# Patient Record
Sex: FEMALE | Race: WHITE | NOT HISPANIC OR LATINO | Employment: FULL TIME | URBAN - METROPOLITAN AREA
[De-identification: names, ages, dates, MRNs, and addresses within clinical notes are randomized per-mention and may not be internally consistent; named-entity substitution may affect disease eponyms.]

---

## 2021-01-24 ENCOUNTER — HOSPITAL ENCOUNTER (EMERGENCY)
Facility: HOSPITAL | Age: 36
Discharge: HOME | End: 2021-01-24
Attending: EMERGENCY MEDICINE | Admitting: EMERGENCY MEDICINE

## 2021-01-24 ENCOUNTER — APPOINTMENT (OUTPATIENT)
Dept: GENERAL RADIOLOGY | Facility: HOSPITAL | Age: 36
End: 2021-01-24

## 2021-01-24 VITALS
BODY MASS INDEX: 42.95 KG/M2 | DIASTOLIC BLOOD PRESSURE: 85 MMHG | RESPIRATION RATE: 17 BRPM | HEART RATE: 85 BPM | OXYGEN SATURATION: 97 % | HEIGHT: 69 IN | WEIGHT: 290 LBS | TEMPERATURE: 98.6 F | SYSTOLIC BLOOD PRESSURE: 127 MMHG

## 2021-01-24 DIAGNOSIS — R03.0 ELEVATED BLOOD PRESSURE READING WITHOUT DIAGNOSIS OF HYPERTENSION: ICD-10-CM

## 2021-01-24 DIAGNOSIS — R00.2 PALPITATIONS: Primary | ICD-10-CM

## 2021-01-24 DIAGNOSIS — R79.89 ELEVATED TSH: ICD-10-CM

## 2021-01-24 LAB
ALBUMIN SERPL-MCNC: 4.1 G/DL (ref 3.5–5.2)
ALBUMIN/GLOB SERPL: 1.5 G/DL
ALP SERPL-CCNC: 99 U/L (ref 39–117)
ALT SERPL W P-5'-P-CCNC: 18 U/L (ref 1–33)
ANION GAP SERPL CALCULATED.3IONS-SCNC: 11 MMOL/L (ref 5–15)
AST SERPL-CCNC: 18 U/L (ref 1–32)
BASOPHILS # BLD AUTO: 0.05 10*3/MM3 (ref 0–0.2)
BASOPHILS NFR BLD AUTO: 0.6 % (ref 0–1.5)
BILIRUB SERPL-MCNC: 0.3 MG/DL (ref 0–1.2)
BILIRUB UR QL STRIP: NEGATIVE
BUN SERPL-MCNC: 13 MG/DL (ref 6–20)
BUN/CREAT SERPL: 15.1 (ref 7–25)
CALCIUM SPEC-SCNC: 9.8 MG/DL (ref 8.6–10.5)
CHLORIDE SERPL-SCNC: 102 MMOL/L (ref 98–107)
CK SERPL-CCNC: 155 U/L (ref 20–180)
CLARITY UR: CLEAR
CO2 SERPL-SCNC: 22 MMOL/L (ref 22–29)
COLOR UR: YELLOW
CREAT SERPL-MCNC: 0.86 MG/DL (ref 0.57–1)
DEPRECATED RDW RBC AUTO: 42 FL (ref 37–54)
EOSINOPHIL # BLD AUTO: 0.17 10*3/MM3 (ref 0–0.4)
EOSINOPHIL NFR BLD AUTO: 1.9 % (ref 0.3–6.2)
ERYTHROCYTE [DISTWIDTH] IN BLOOD BY AUTOMATED COUNT: 12.7 % (ref 12.3–15.4)
GFR SERPL CREATININE-BSD FRML MDRD: 76 ML/MIN/1.73
GLOBULIN UR ELPH-MCNC: 2.7 GM/DL
GLUCOSE SERPL-MCNC: 91 MG/DL (ref 65–99)
GLUCOSE UR STRIP-MCNC: NEGATIVE MG/DL
HCT VFR BLD AUTO: 39.5 % (ref 34–46.6)
HGB BLD-MCNC: 12.5 G/DL (ref 12–15.9)
HGB UR QL STRIP.AUTO: NEGATIVE
HOLD SPECIMEN: NORMAL
HOLD SPECIMEN: NORMAL
IMM GRANULOCYTES # BLD AUTO: 0.03 10*3/MM3 (ref 0–0.05)
IMM GRANULOCYTES NFR BLD AUTO: 0.3 % (ref 0–0.5)
KETONES UR QL STRIP: NEGATIVE
LEUKOCYTE ESTERASE UR QL STRIP.AUTO: NEGATIVE
LIPASE SERPL-CCNC: 31 U/L (ref 13–60)
LYMPHOCYTES # BLD AUTO: 2.63 10*3/MM3 (ref 0.7–3.1)
LYMPHOCYTES NFR BLD AUTO: 30.1 % (ref 19.6–45.3)
MAGNESIUM SERPL-MCNC: 2 MG/DL (ref 1.6–2.6)
MCH RBC QN AUTO: 28.7 PG (ref 26.6–33)
MCHC RBC AUTO-ENTMCNC: 31.6 G/DL (ref 31.5–35.7)
MCV RBC AUTO: 90.6 FL (ref 79–97)
MONOCYTES # BLD AUTO: 0.45 10*3/MM3 (ref 0.1–0.9)
MONOCYTES NFR BLD AUTO: 5.1 % (ref 5–12)
NEUTROPHILS NFR BLD AUTO: 5.41 10*3/MM3 (ref 1.7–7)
NEUTROPHILS NFR BLD AUTO: 62 % (ref 42.7–76)
NITRITE UR QL STRIP: NEGATIVE
NRBC BLD AUTO-RTO: 0 /100 WBC (ref 0–0.2)
NT-PROBNP SERPL-MCNC: 86.1 PG/ML (ref 0–450)
PH UR STRIP.AUTO: <=5 [PH] (ref 5–8)
PLATELET # BLD AUTO: 241 10*3/MM3 (ref 140–450)
PMV BLD AUTO: 9.4 FL (ref 6–12)
POTASSIUM SERPL-SCNC: 4.2 MMOL/L (ref 3.5–5.2)
PROT SERPL-MCNC: 6.8 G/DL (ref 6–8.5)
PROT UR QL STRIP: NEGATIVE
RBC # BLD AUTO: 4.36 10*6/MM3 (ref 3.77–5.28)
SODIUM SERPL-SCNC: 135 MMOL/L (ref 136–145)
SP GR UR STRIP: 1.01 (ref 1–1.03)
TROPONIN T SERPL-MCNC: <0.01 NG/ML (ref 0–0.03)
TSH SERPL DL<=0.05 MIU/L-ACNC: 4.44 UIU/ML (ref 0.27–4.2)
UROBILINOGEN UR QL STRIP: NORMAL
WBC # BLD AUTO: 8.74 10*3/MM3 (ref 3.4–10.8)
WHOLE BLOOD HOLD SPECIMEN: NORMAL
WHOLE BLOOD HOLD SPECIMEN: NORMAL

## 2021-01-24 PROCEDURE — 85025 COMPLETE CBC W/AUTO DIFF WBC: CPT | Performed by: NURSE PRACTITIONER

## 2021-01-24 PROCEDURE — 84484 ASSAY OF TROPONIN QUANT: CPT | Performed by: NURSE PRACTITIONER

## 2021-01-24 PROCEDURE — 84443 ASSAY THYROID STIM HORMONE: CPT | Performed by: NURSE PRACTITIONER

## 2021-01-24 PROCEDURE — 99283 EMERGENCY DEPT VISIT LOW MDM: CPT

## 2021-01-24 PROCEDURE — 81003 URINALYSIS AUTO W/O SCOPE: CPT | Performed by: NURSE PRACTITIONER

## 2021-01-24 PROCEDURE — 83690 ASSAY OF LIPASE: CPT | Performed by: NURSE PRACTITIONER

## 2021-01-24 PROCEDURE — 93005 ELECTROCARDIOGRAM TRACING: CPT | Performed by: EMERGENCY MEDICINE

## 2021-01-24 PROCEDURE — 80053 COMPREHEN METABOLIC PANEL: CPT | Performed by: NURSE PRACTITIONER

## 2021-01-24 PROCEDURE — 83735 ASSAY OF MAGNESIUM: CPT | Performed by: NURSE PRACTITIONER

## 2021-01-24 PROCEDURE — 83880 ASSAY OF NATRIURETIC PEPTIDE: CPT | Performed by: NURSE PRACTITIONER

## 2021-01-24 PROCEDURE — 82550 ASSAY OF CK (CPK): CPT | Performed by: NURSE PRACTITIONER

## 2021-01-24 PROCEDURE — 71045 X-RAY EXAM CHEST 1 VIEW: CPT

## 2021-01-24 PROCEDURE — 93005 ELECTROCARDIOGRAM TRACING: CPT

## 2021-01-24 RX ORDER — SODIUM CHLORIDE 0.9 % (FLUSH) 0.9 %
10 SYRINGE (ML) INJECTION AS NEEDED
Status: DISCONTINUED | OUTPATIENT
Start: 2021-01-24 | End: 2021-01-25 | Stop reason: HOSPADM

## 2021-01-24 RX ADMIN — SODIUM CHLORIDE 1000 ML: 9 INJECTION, SOLUTION INTRAVENOUS at 21:01

## 2021-01-25 NOTE — DISCHARGE INSTRUCTIONS
Home to rest.  Anticipate a call tomorrow from the outpatient cardiology heart valve clinic for further evaluation of your experience palpitations.  Follow-up with Dr. Machado to further evaluate and rule out hypothyroidism in consideration of your slightly elevated TSH tonight.  Maintain your very best hydration and nutrition.  Return to the emergency department as needed for worsening symptoms or concerns.  Thank you

## 2021-01-25 NOTE — ED PROVIDER NOTES
Subjective   Regla Holliday is a 35 year old female who presents to the ED with heart palpitations onset 2 days ago. The patient reports large spikes and frequent changes in blood pressure via a home monitor with highs of 160 mmHg systolic over 100 mmHg diastolic before returning to baseline. She also reports frequent variation in heart rate. The patient initially presented to an [facility], but she was advised to be seen in the ED. She states that she has never experienced similar symptoms in the past, and she denies history of arrhythmia, stroke, blood clots, and myocardial infarction as well as a history of cardiac catheterization. The patient has a history of panic attacks for which she takes Xanax as needed, and she reports taking Xanax once in the past 30 days. She confirms long-term use of Adderall as prescribed, denying any side effects similar to her current symptoms. The patient quit smoking in 12/2020, and she drinks a cup of coffee in the mornings She otherwise denies the use of stimulants. The patient has an intrauterine device in place, denying any chance of pregnancy. There are no other acute symptoms present at this time.      History provided by:  Patient  Palpitations  Palpitations quality:  Unable to specify  Onset quality:  Sudden  Duration:  2 days  Timing:  Sporadic  Progression:  Unchanged  Chronicity:  New  Relieved by:  Nothing  Worsened by:  Nothing  Ineffective treatments:  Deep relaxation  Risk factors: no heart disease, no hx of atrial fibrillation, no hx of DVT and no hx of PE        Review of Systems   Cardiovascular: Positive for palpitations.        Frequent variations in heart rate and blood pressure. Highest self-reported blood pressure is 160mmHg/100mmHG. The highest self-reported heart rate is 98 beats per minute.   All other systems reviewed and are negative.      History reviewed. No pertinent past medical history.    Allergies   Allergen Reactions   • Ibuprofen  Hives       History reviewed. No pertinent surgical history.    No family history on file.    Social History     Socioeconomic History   • Marital status:      Spouse name: Not on file   • Number of children: Not on file   • Years of education: Not on file   • Highest education level: Not on file         Objective   Physical Exam  Vitals signs and nursing note reviewed.   Constitutional:       General: She is not in acute distress.     Appearance: She is well-developed. She is obese.      Comments: Patient is excellent historian. No acute distress. Patient is obese.   HENT:      Head: Normocephalic and atraumatic.      Nose: Nose normal.   Eyes:      General: No scleral icterus.     Conjunctiva/sclera: Conjunctivae normal.   Neck:      Musculoskeletal: Normal range of motion and neck supple.   Cardiovascular:      Rate and Rhythm: Regular rhythm.      Heart sounds: Normal heart sounds. No murmur.      Comments: Heart sounds clear. Regular rhythm. Heart rate is 100 beats per minute. No peripheral edema appreciated bilaterally.  Pulmonary:      Effort: Pulmonary effort is normal. No respiratory distress.      Breath sounds: Normal breath sounds.      Comments: Lungs clear to oscillation bilaterally. Normal work of breathing.  Abdominal:      Palpations: Abdomen is soft.      Tenderness: There is no abdominal tenderness.      Comments: Abdomen soft and nontender.   Musculoskeletal: Normal range of motion.      Right lower leg: No edema.      Left lower leg: No edema.      Comments: No peripheral edema bilaterally.   Skin:     General: Skin is warm and dry.   Neurological:      General: No focal deficit present.      Mental Status: She is alert and oriented to person, place, and time.      Comments: No neurosensory deficits or focal weakness.   Psychiatric:         Behavior: Behavior normal.         Procedures         ED Course  ED Course as of Jan 24 2145   Sun Jan 24, 2021 2140 Patient's work-up is  reassuring. Perc criteria zero.  Wells score zero.  Her vital signs have been stable throughout her ED course.  Heart rate has remained between 60s to 80s.  She has had no hypertension or hypotension.  A sinus arrhythmia has been noted.  Patient accepts invitation to the heart and valve cardiology clinic as an outpatient.  We discussed the slight elevation in her TSH.  I have recommended her follow-up with her primary care provider to have additional thyroid studies for higher specificity to rule out hypothyroidism.    [MS]   2141 Nitrite, UA: Negative [MS]      ED Course User Index  [MS] Jason Rickettsshelly Lara, JAILENE      Recent Results (from the past 24 hour(s))   Light Blue Top    Collection Time: 01/24/21  7:24 PM   Result Value Ref Range    Extra Tube hold for add-on    Green Top (Gel)    Collection Time: 01/24/21  7:24 PM   Result Value Ref Range    Extra Tube Hold for add-ons.    Lavender Top    Collection Time: 01/24/21  7:24 PM   Result Value Ref Range    Extra Tube hold for add-on    Gold Top - SST    Collection Time: 01/24/21  7:24 PM   Result Value Ref Range    Extra Tube Hold for add-ons.    Comprehensive Metabolic Panel    Collection Time: 01/24/21  7:24 PM    Specimen: Blood   Result Value Ref Range    Glucose 91 65 - 99 mg/dL    BUN 13 6 - 20 mg/dL    Creatinine 0.86 0.57 - 1.00 mg/dL    Sodium 135 (L) 136 - 145 mmol/L    Potassium 4.2 3.5 - 5.2 mmol/L    Chloride 102 98 - 107 mmol/L    CO2 22.0 22.0 - 29.0 mmol/L    Calcium 9.8 8.6 - 10.5 mg/dL    Total Protein 6.8 6.0 - 8.5 g/dL    Albumin 4.10 3.50 - 5.20 g/dL    ALT (SGPT) 18 1 - 33 U/L    AST (SGOT) 18 1 - 32 U/L    Alkaline Phosphatase 99 39 - 117 U/L    Total Bilirubin 0.3 0.0 - 1.2 mg/dL    eGFR Non African Amer 76 >60 mL/min/1.73    Globulin 2.7 gm/dL    A/G Ratio 1.5 g/dL    BUN/Creatinine Ratio 15.1 7.0 - 25.0    Anion Gap 11.0 5.0 - 15.0 mmol/L   Lipase    Collection Time: 01/24/21  7:24 PM    Specimen: Blood   Result Value Ref Range     Lipase 31 13 - 60 U/L   BNP    Collection Time: 01/24/21  7:24 PM    Specimen: Blood   Result Value Ref Range    proBNP 86.1 0.0 - 450.0 pg/mL   Troponin    Collection Time: 01/24/21  7:24 PM    Specimen: Blood   Result Value Ref Range    Troponin T <0.010 0.000 - 0.030 ng/mL   CK    Collection Time: 01/24/21  7:24 PM    Specimen: Blood   Result Value Ref Range    Creatine Kinase 155 20 - 180 U/L   TSH    Collection Time: 01/24/21  7:24 PM    Specimen: Blood   Result Value Ref Range    TSH 4.440 (H) 0.270 - 4.200 uIU/mL   Magnesium    Collection Time: 01/24/21  7:24 PM    Specimen: Blood   Result Value Ref Range    Magnesium 2.0 1.6 - 2.6 mg/dL   CBC Auto Differential    Collection Time: 01/24/21  7:24 PM    Specimen: Blood   Result Value Ref Range    WBC 8.74 3.40 - 10.80 10*3/mm3    RBC 4.36 3.77 - 5.28 10*6/mm3    Hemoglobin 12.5 12.0 - 15.9 g/dL    Hematocrit 39.5 34.0 - 46.6 %    MCV 90.6 79.0 - 97.0 fL    MCH 28.7 26.6 - 33.0 pg    MCHC 31.6 31.5 - 35.7 g/dL    RDW 12.7 12.3 - 15.4 %    RDW-SD 42.0 37.0 - 54.0 fl    MPV 9.4 6.0 - 12.0 fL    Platelets 241 140 - 450 10*3/mm3    Neutrophil % 62.0 42.7 - 76.0 %    Lymphocyte % 30.1 19.6 - 45.3 %    Monocyte % 5.1 5.0 - 12.0 %    Eosinophil % 1.9 0.3 - 6.2 %    Basophil % 0.6 0.0 - 1.5 %    Immature Grans % 0.3 0.0 - 0.5 %    Neutrophils, Absolute 5.41 1.70 - 7.00 10*3/mm3    Lymphocytes, Absolute 2.63 0.70 - 3.10 10*3/mm3    Monocytes, Absolute 0.45 0.10 - 0.90 10*3/mm3    Eosinophils, Absolute 0.17 0.00 - 0.40 10*3/mm3    Basophils, Absolute 0.05 0.00 - 0.20 10*3/mm3    Immature Grans, Absolute 0.03 0.00 - 0.05 10*3/mm3    nRBC 0.0 0.0 - 0.2 /100 WBC   Urinalysis With Microscopic If Indicated (No Culture) - Urine, Clean Catch    Collection Time: 01/24/21  8:33 PM    Specimen: Urine, Clean Catch   Result Value Ref Range    Color, UA Yellow Yellow, Straw    Appearance, UA Clear Clear    pH, UA <=5.0 5.0 - 8.0    Specific Gravity, UA 1.010 1.001 - 1.030    Glucose,  "UA Negative Negative    Ketones, UA Negative Negative    Bilirubin, UA Negative Negative    Blood, UA Negative Negative    Protein, UA Negative Negative    Leuk Esterase, UA Negative Negative    Nitrite, UA Negative Negative    Urobilinogen, UA 0.2 E.U./dL 0.2 - 1.0 E.U./dL     Note: In addition to lab results from this visit, the labs listed above may include labs taken at another facility or during a different encounter within the last 24 hours. Please correlate lab times with ED admission and discharge times for further clarification of the services performed during this visit.    XR Chest 1 View    (Results Pending)     Vitals:    01/24/21 1903 01/24/21 2130   BP: 154/95 127/85   BP Location: Left arm    Patient Position: Sitting    Pulse: 68 71   Resp: 17    Temp: 98.6 °F (37 °C)    TempSrc: Infrared    SpO2: 99% 100%   Weight: 132 kg (290 lb)    Height: 175.3 cm (69\")      Medications   sodium chloride 0.9 % flush 10 mL (has no administration in time range)   sodium chloride 0.9 % flush 10 mL (has no administration in time range)   sodium chloride 0.9 % bolus 1,000 mL (1,000 mL Intravenous New Bag 1/24/21 2101)     ECG/EMG Results (last 24 hours)     Procedure Component Value Units Date/Time    ECG 12 Lead [[id number]] Collected: 01/24/21 1918     Updated: 01/24/21 1919        ECG 12 Lead                                                      MDM    Final diagnoses:   Palpitations   Elevated blood pressure reading without diagnosis of hypertension   Elevated TSH     DISCHARGE    Patient discharged in stable condition.    Reviewed implications of results, diagnosis, meds, responsibility to follow up, warning signs and symptoms of possible worsening, potential complications and reasons to return to ER.    Patient/Family voiced understanding of above instructions.    Discussed plan for discharge, as there is no emergent indication for admission.  Pt/family is agreeable and understands need for follow up and possible " repeat testing.  Pt/family is aware that discharge does not mean that nothing is wrong but that it indicates no emergency is currently present that requires admission and they must continue care with follow-up as given below or with a physician of their choice.     FOLLOW-UP  Gilda Machado, DO  [address]  [address]  [facility] [address]  [phone]    Schedule an appointment as soon as possible for a visit in 2 days  If symptoms worsen    [facility] - HEART & VASCULAR  [facility] [facility]  [address]  [facility] [id number] [phone]             Medication List      No changes were made to your prescriptions during this visit.         Documentation assistance provided by scribe Lauren K Collett.  Information recorded by the scribe was done at my direction and has been verified and validated by me.     Collett, Lauren K  01/24/21 4115       Lo Ricketts APRN  01/25/21 9408

## 2021-01-28 ENCOUNTER — OFFICE VISIT (OUTPATIENT)
Dept: CARDIOLOGY | Facility: HOSPITAL | Age: 36
End: 2021-01-28

## 2021-01-28 ENCOUNTER — HOSPITAL ENCOUNTER (OUTPATIENT)
Dept: CARDIOLOGY | Facility: HOSPITAL | Age: 36
Discharge: HOME | End: 2021-01-28

## 2021-01-28 VITALS
HEART RATE: 91 BPM | RESPIRATION RATE: 23 BRPM | WEIGHT: 293 LBS | HEIGHT: 69 IN | OXYGEN SATURATION: 98 % | DIASTOLIC BLOOD PRESSURE: 86 MMHG | SYSTOLIC BLOOD PRESSURE: 122 MMHG | BODY MASS INDEX: 43.4 KG/M2 | TEMPERATURE: 98.2 F

## 2021-01-28 DIAGNOSIS — R00.2 PALPITATIONS: Primary | ICD-10-CM

## 2021-01-28 LAB
QT INTERVAL: 392 MS
QTC INTERVAL: 437 MS

## 2021-01-28 PROCEDURE — 99203 OFFICE O/P NEW LOW 30 MIN: CPT | Performed by: NURSE PRACTITIONER

## 2021-01-28 PROCEDURE — 93246 EXT ECG>7D<15D RECORDING: CPT

## 2021-01-28 RX ORDER — DEXTROAMPHETAMINE SACCHARATE, AMPHETAMINE ASPARTATE, DEXTROAMPHETAMINE SULFATE AND AMPHETAMINE SULFATE 5; 5; 5; 5 MG/1; MG/1; MG/1; MG/1
20 TABLET ORAL 2 TIMES DAILY PRN
COMMUNITY
Start: 2021-01-18 | End: 2021-10-22

## 2021-01-28 RX ORDER — ALBUTEROL SULFATE 90 UG/1
AEROSOL, METERED RESPIRATORY (INHALATION)
COMMUNITY
End: 2021-10-22

## 2021-01-28 RX ORDER — ERGOCALCIFEROL 1.25 MG/1
50000 CAPSULE ORAL WEEKLY
COMMUNITY
End: 2021-10-22

## 2021-01-28 RX ORDER — COPPER 313.4 MG/1
INTRAUTERINE DEVICE INTRAUTERINE
COMMUNITY

## 2021-01-28 RX ORDER — ALPRAZOLAM 0.5 MG/1
0.5 TABLET ORAL AS NEEDED
COMMUNITY
End: 2022-12-12

## 2021-01-28 NOTE — PROGRESS NOTES
"Chief Complaint  Palpitations, Hypertension, and Establish Care    Subjective    History of Present Illness {CC  Problem List  Visit  Diagnosis   Encounters  Notes  Medications  Labs  Result Review Imaging  Media :23}     Regla Holliday presents to [facility] - HEART & VASCULAR for     Ms. Holliday follows up in the Heart and Valve Center today at the request of JAILENE Sánchez from the ER.  She presented there on 1/25/21 for symptoms of palpitations two days duration associated with elevated BP.     Patient states she has taken Adderall regularly for years and never had issues with high BP or tachycardia.  She has experienced a panic attack in the past, but her palpitation symptoms on 1/25 felt more like \"fluttering\" followed by feeling somewhat light headed and fatigued afterwards.  No excessive caffeine intake.  No alcohol intake or other stimulant use. No recurrent episodes since ER visit.       Objective     Vital Signs:   Vitals:    01/28/21 0946 01/28/21 0948 01/28/21 0949   BP: 127/80 118/77 122/86   BP Location: Right arm Left arm Left arm   Patient Position: Sitting Sitting Standing   Cuff Size: Large Adult Large Adult Large Adult   Pulse: 78 81 91   Resp:   23   Temp:   98.2 °F (36.8 °C)   TempSrc:   Temporal   SpO2: 99% 99% 98%   Weight:   135 kg (298 lb 2 oz)   Height:   175.3 cm (69\")     Body mass index is 44.03 kg/m².  Physical Exam  Vitals signs reviewed.   Constitutional:       General: She is not in acute distress.     Appearance: She is not ill-appearing.   HENT:      Head: Normocephalic and atraumatic.   Eyes:      Extraocular Movements: Extraocular movements intact.      Pupils: Pupils are equal, round, and reactive to light.   Cardiovascular:      Rate and Rhythm: Normal rate and regular rhythm.      Pulses: Normal pulses.      Heart sounds: Normal heart sounds. No murmur.   Pulmonary:      Effort: Pulmonary effort is normal.      Breath sounds: Normal breath " sounds.   Abdominal:      General: Abdomen is flat.      Palpations: Abdomen is soft.   Musculoskeletal: Normal range of motion.      Right lower leg: No edema.      Left lower leg: No edema.   Skin:     General: Skin is warm and dry.      Capillary Refill: Capillary refill takes 2 to 3 seconds.   Neurological:      General: No focal deficit present.      Mental Status: She is alert and oriented to person, place, and time.   Psychiatric:         Mood and Affect: Mood normal.         Behavior: Behavior normal.        Result Review  Data Reviewed:{ Labs  Result Review  Imaging  Med Tab  Media :23}     ER notes, labs, ekg's            Assessment and Plan {CC Problem List  Visit Diagnosis  ROS  Review (Popup)  Health Maintenance  Quality  BestPractice  Medications  SmartSets  SnapShot Encounters  Media :23}   1. Palpitations  - Holter Monitor 5-7 days to evaluate for dysrhythmia  - Adult Transthoracic Echo Complete w/ Color, Spectral and Contrast if necessary per protocol due to chronic stimulant use r/t ADHD       28 minutes    Follow Up {Instructions Charge Capture  Follow-up Communications :23}   Return in about 2 weeks (around 2/11/2021) for Video visit, Monitor/ testing follow up.    Patient was given instructions and counseling regarding her condition or for health maintenance advice. Please see specific information pulled into the AVS if appropriate.  Patient was instructed to call the Heart and Valve Center with any questions, concerns, or worsening symptoms.    *Please note that portions of this note were completed with a voice recognition program. Efforts were made to edit the dictations, but occasionally words are mistranscribed.

## 2021-01-28 NOTE — PROGRESS NOTES
[facility] Heart Monitor Documentation    Regla Holliday  1985  [id number]  01/28/21    JAILENE Cano    [] ZIO XT Patch  Model [id number] Prescribed for N/A Days    · Serial Number: (N + 9 Digits) N   · Apply-By Date on Box:   · USPS Tracking Number:   · USPS Tracking        [] [employer] MINI PLUS Mobile Cardiac Telemetry  Model BGMINIPLUS Prescribed for N/A Days    · Serial Number: (BGM + 7 Digits) BGM  · Shipped-By Date on Box:   · UPS Tracking Number: [id number]  · UPS Tracking      [] [employer] MINI Holter Monitor  Model BGMINIEL Prescribed for 7 Days    · Serial Number: (7 Digits) [id number]  · Shipped-By Date on Box: [id number]  · UPS Tracking Number: [id number]  · UPS Tracking        This monitor was applied to the patient's chest and checked for proper functioning.  Ms. Regla Holliday was instructed in the proper use of this monitor.  She was given the opportunity to ask questions and left the office with the device 's instruction manual.    Henny Rashid MA, 10:34 EST, 01/28/21                  [facility]

## 2021-02-08 ENCOUNTER — APPOINTMENT (OUTPATIENT)
Dept: CARDIOLOGY | Facility: HOSPITAL | Age: 36
End: 2021-02-08

## 2021-03-25 PROCEDURE — 93248 EXT ECG>7D<15D REV&INTERPJ: CPT | Performed by: INTERNAL MEDICINE

## 2021-04-12 ENCOUNTER — OFFICE VISIT (OUTPATIENT)
Dept: CARDIOLOGY | Facility: HOSPITAL | Age: 36
End: 2021-04-12

## 2021-04-12 NOTE — PROGRESS NOTES
Unable to reach patient by phone for visit today in order to review abnormal holter monitor study.  PVC burden is 15%.  LM to say monitor was abnormal.  Please call back to discuss details and treatment plan. Letter has also been sent out to patient on 3/30/21.    María DENT

## 2021-10-19 ENCOUNTER — HOSPITAL ENCOUNTER (OUTPATIENT)
Dept: CARDIOLOGY | Facility: HOSPITAL | Age: 36
Discharge: HOME | End: 2021-10-19
Admitting: NURSE PRACTITIONER

## 2021-10-19 DIAGNOSIS — R00.2 PALPITATIONS: ICD-10-CM

## 2021-10-19 LAB
BH CV ECHO MEAS - AO MAX PG (FULL): 5.4 MMHG
BH CV ECHO MEAS - AO MAX PG: 8.6 MMHG
BH CV ECHO MEAS - AO MEAN PG (FULL): 2.9 MMHG
BH CV ECHO MEAS - AO MEAN PG: 4.8 MMHG
BH CV ECHO MEAS - AO ROOT AREA: 8.8 CM^2
BH CV ECHO MEAS - AO ROOT DIAM: 3.4 CM
BH CV ECHO MEAS - AO V2 MAX: 146.6 CM/SEC
BH CV ECHO MEAS - AO V2 MEAN: 105.3 CM/SEC
BH CV ECHO MEAS - AO V2 VTI: 33.7 CM
BH CV ECHO MEAS - AVA(I,A): 2 CM^2
BH CV ECHO MEAS - AVA(I,D): 2 CM^2
BH CV ECHO MEAS - AVA(V,A): 1.9 CM^2
BH CV ECHO MEAS - AVA(V,D): 1.9 CM^2
BH CV ECHO MEAS - EDV(CUBED): 210.1 ML
BH CV ECHO MEAS - EDV(MOD-SP2): 193 ML
BH CV ECHO MEAS - EDV(MOD-SP4): 223 ML
BH CV ECHO MEAS - EDV(TEICH): 176.2 ML
BH CV ECHO MEAS - EF(CUBED): 60.9 %
BH CV ECHO MEAS - EF(MOD-BP): 64 %
BH CV ECHO MEAS - EF(MOD-SP2): 61.7 %
BH CV ECHO MEAS - EF(MOD-SP4): 64.6 %
BH CV ECHO MEAS - EF(TEICH): 51.6 %
BH CV ECHO MEAS - ESV(CUBED): 82.2 ML
BH CV ECHO MEAS - ESV(MOD-SP2): 74 ML
BH CV ECHO MEAS - ESV(MOD-SP4): 79 ML
BH CV ECHO MEAS - ESV(TEICH): 85.3 ML
BH CV ECHO MEAS - FS: 26.8 %
BH CV ECHO MEAS - IVS/LVPW: 0.64
BH CV ECHO MEAS - IVSD: 0.6 CM
BH CV ECHO MEAS - LA DIMENSION: 4.5 CM
BH CV ECHO MEAS - LA/AO: 1.4
BH CV ECHO MEAS - LAD MAJOR: 5.7 CM
BH CV ECHO MEAS - LAT PEAK E' VEL: 14.6 CM/SEC
BH CV ECHO MEAS - LATERAL E/E' RATIO: 7.2
BH CV ECHO MEAS - LV MASS(C)D: 185.5 GRAMS
BH CV ECHO MEAS - LV MAX PG: 3.1 MMHG
BH CV ECHO MEAS - LV MEAN PG: 2 MMHG
BH CV ECHO MEAS - LV V1 MAX: 88.7 CM/SEC
BH CV ECHO MEAS - LV V1 MEAN: 64.9 CM/SEC
BH CV ECHO MEAS - LV V1 VTI: 21.2 CM
BH CV ECHO MEAS - LVIDD: 5.9 CM
BH CV ECHO MEAS - LVIDS: 4.3 CM
BH CV ECHO MEAS - LVLD AP2: 9.1 CM
BH CV ECHO MEAS - LVLD AP4: 9.2 CM
BH CV ECHO MEAS - LVLS AP2: 8 CM
BH CV ECHO MEAS - LVLS AP4: 7.6 CM
BH CV ECHO MEAS - LVOT AREA (M): 3.1 CM^2
BH CV ECHO MEAS - LVOT AREA: 3.2 CM^2
BH CV ECHO MEAS - LVOT DIAM: 2 CM
BH CV ECHO MEAS - LVPWD: 1 CM
BH CV ECHO MEAS - MED PEAK E' VEL: 15 CM/SEC
BH CV ECHO MEAS - MEDIAL E/E' RATIO: 7
BH CV ECHO MEAS - MV A MAX VEL: 72.1 CM/SEC
BH CV ECHO MEAS - MV DEC TIME: 0.21 SEC
BH CV ECHO MEAS - MV E MAX VEL: 106 CM/SEC
BH CV ECHO MEAS - MV E/A: 1.5
BH CV ECHO MEAS - MV MAX PG: 7.4 MMHG
BH CV ECHO MEAS - MV MEAN PG: 2.4 MMHG
BH CV ECHO MEAS - MV V2 MAX: 136.3 CM/SEC
BH CV ECHO MEAS - MV V2 MEAN: 70.6 CM/SEC
BH CV ECHO MEAS - MV V2 VTI: 38.5 CM
BH CV ECHO MEAS - MVA(VTI): 1.7 CM^2
BH CV ECHO MEAS - PA ACC SLOPE: 534 CM/SEC^2
BH CV ECHO MEAS - PA ACC TIME: 0.15 SEC
BH CV ECHO MEAS - PA MAX PG: 5.2 MMHG
BH CV ECHO MEAS - PA PR(ACCEL): 12.5 MMHG
BH CV ECHO MEAS - PA V2 MAX: 114 CM/SEC
BH CV ECHO MEAS - SV(AO): 298.1 ML
BH CV ECHO MEAS - SV(CUBED): 127.8 ML
BH CV ECHO MEAS - SV(LVOT): 66.9 ML
BH CV ECHO MEAS - SV(MOD-SP2): 119 ML
BH CV ECHO MEAS - SV(MOD-SP4): 144 ML
BH CV ECHO MEAS - SV(TEICH): 90.9 ML
BH CV ECHO MEAS - TAPSE (>1.6): 2.5 CM
BH CV ECHO MEASUREMENTS AVERAGE E/E' RATIO: 7.16
BH CV XLRA - RV BASE: 3.6 CM
BH CV XLRA - RV LENGTH: 7.5 CM
BH CV XLRA - RV MID: 2.7 CM
BH CV XLRA - TDI S': 15.1 CM/SEC
LEFT ATRIUM VOLUME INDEX: 20 ML/M2
LEFT ATRIUM VOLUME: 74 ML
LV EF 2D ECHO EST: 65 %
MAXIMAL PREDICTED HEART RATE: 186 BPM
STRESS TARGET HR: 158 BPM

## 2021-10-19 PROCEDURE — 93306 TTE W/DOPPLER COMPLETE: CPT | Performed by: INTERNAL MEDICINE

## 2021-10-19 PROCEDURE — 93306 TTE W/DOPPLER COMPLETE: CPT

## 2021-10-20 ENCOUNTER — OFFICE VISIT (OUTPATIENT)
Dept: CARDIOLOGY | Facility: HOSPITAL | Age: 36
End: 2021-10-20

## 2021-10-20 VITALS
BODY MASS INDEX: 43.4 KG/M2 | WEIGHT: 293 LBS | SYSTOLIC BLOOD PRESSURE: 128 MMHG | HEART RATE: 72 BPM | OXYGEN SATURATION: 97 % | DIASTOLIC BLOOD PRESSURE: 84 MMHG | HEIGHT: 69 IN | TEMPERATURE: 97.4 F | RESPIRATION RATE: 16 BRPM

## 2021-10-20 DIAGNOSIS — I49.3 FREQUENT UNIFOCAL PVCS: ICD-10-CM

## 2021-10-20 DIAGNOSIS — R00.2 PALPITATIONS: Primary | ICD-10-CM

## 2021-10-20 PROCEDURE — 99213 OFFICE O/P EST LOW 20 MIN: CPT | Performed by: NURSE PRACTITIONER

## 2021-10-20 RX ORDER — METOPROLOL SUCCINATE 25 MG/1
25 TABLET, EXTENDED RELEASE ORAL DAILY
Qty: 30 TABLET | Refills: 11 | Status: SHIPPED | OUTPATIENT
Start: 2021-10-20 | End: 2021-10-22

## 2021-10-20 NOTE — PROGRESS NOTES
"Chief Complaint  Irregular Heart Beat and Follow-up    Subjective    History of Present Illness {CC  Problem List  Visit  Diagnosis   Encounters  Notes  Medications  Labs  Result Review Imaging  Media :23}     Regla Holliday presents to [facility] CARDIOLOGY for   Ms. Holliday follows up on palpitations.  In January, she was seen in ER for palpitations and came in to [facility] afterwards.  She completed a cardiac rhythm monitor but did not return for follow up.  PVC burden noted to be 15%.  She completed her echocardiogram yesterday.      Patient states she experiences palpitations daily, worse with when rushed or feeling anxious.  Denies dyspnea, REYNOSO, or chest pain.       Objective     Vital Signs:   Vitals:    10/20/21 0927 10/20/21 0930 10/20/21 0956   BP: 153/81 141/87 128/84   BP Location: Left arm Right arm    Patient Position: Sitting Sitting    Cuff Size: Adult Adult    Pulse: 65 72    Resp: 16     Temp: 97.4 °F (36.3 °C)     TempSrc: Temporal     SpO2: 97%     Weight: (!) 143 kg (316 lb 2 oz)     Height: 175.3 cm (69\")       Body mass index is 46.68 kg/m².  Physical Exam  Vitals reviewed.   Constitutional:       General: She is not in acute distress.     Appearance: She is obese.   HENT:      Head: Normocephalic and atraumatic.   Eyes:      Pupils: Pupils are equal, round, and reactive to light.   Neck:      Vascular: No carotid bruit.   Cardiovascular:      Rate and Rhythm: Normal rate and regular rhythm.      Pulses: Normal pulses.      Heart sounds: No murmur heard.  No friction rub. No gallop.    Pulmonary:      Effort: Pulmonary effort is normal.      Breath sounds: Normal breath sounds.   Musculoskeletal:         General: Normal range of motion.      Cervical back: Neck supple.      Right lower leg: No edema.      Left lower leg: No edema.   Lymphadenopathy:      Cervical: No cervical adenopathy.   Skin:     General: Skin is warm and dry.   Neurological:      General: No focal " deficit present.      Mental Status: She is alert and oriented to person, place, and time.   Psychiatric:         Mood and Affect: Mood normal.         Behavior: Behavior normal.          Result Review  Data Reviewed:{ Labs  Result Review  Imaging  Med Tab  Media :23}     Cardiology studies :    Holter Monitor - 72 Hour Up To 15 Days (01/28/2021 16:26)  Adult Transthoracic Echo Complete w/ Color, Spectral and Contrast if necessary per protocol (10/19/2021 17:04)           Assessment and Plan {CC Problem List  Visit Diagnosis  ROS  Review (Popup)  Health Maintenance  Quality  BestPractice  Medications  SmartSets  SnapShot Encounters  Media :23}   1. Palpitations  - PVC burden 15%  - echo WNL.  LVEF 65%  - trial metoprolol xl 25 mg QPM  - she plans to discuss w/ Behavioral Health regarding potential DC stimulant Adderall  - Ambulator referral to Sleep Medicine (snoring and poorly refreshing sleep)  - work toward healthier BMI- daily walking 20-30 minutes  - alcohol in moderation/ avoid caffeine      32 minutes    Follow Up {Instructions Charge Capture  Follow-up Communications :23}   Return in about 1 month (around 11/20/2021).    Patient was given instructions and counseling regarding her condition or for health maintenance advice. Please see specific information pulled into the AVS if appropriate.  Patient was instructed to call the Heart and Valve Center with any questions, concerns, or worsening symptoms.

## 2021-10-22 ENCOUNTER — OFFICE VISIT (OUTPATIENT)
Dept: FAMILY MEDICINE CLINIC | Facility: CLINIC | Age: 36
End: 2021-10-22

## 2021-10-22 DIAGNOSIS — Z12.4 ENCOUNTER FOR PAPANICOLAOU SMEAR FOR CERVICAL CANCER SCREENING: ICD-10-CM

## 2021-10-22 DIAGNOSIS — Z00.00 ENCOUNTER FOR MEDICAL EXAMINATION TO ESTABLISH CARE: Primary | ICD-10-CM

## 2021-10-22 DIAGNOSIS — N64.52 BREAST DISCHARGE: ICD-10-CM

## 2021-10-22 DIAGNOSIS — Z11.59 ENCOUNTER FOR HEPATITIS C SCREENING TEST FOR LOW RISK PATIENT: ICD-10-CM

## 2021-10-22 DIAGNOSIS — Z13.220 SCREENING, LIPID: ICD-10-CM

## 2021-10-22 DIAGNOSIS — N63.21 BREAST LUMP ON LEFT SIDE AT 2 O'CLOCK POSITION: ICD-10-CM

## 2021-10-22 DIAGNOSIS — Z13.1 SCREENING FOR DIABETES MELLITUS (DM): ICD-10-CM

## 2021-10-22 DIAGNOSIS — Z23 IMMUNIZATION DUE: ICD-10-CM

## 2021-10-22 DIAGNOSIS — Z80.3 FAMILY HX-BREAST MALIGNANCY: ICD-10-CM

## 2021-10-22 DIAGNOSIS — Z11.4 SCREENING FOR HIV (HUMAN IMMUNODEFICIENCY VIRUS): ICD-10-CM

## 2021-10-22 DIAGNOSIS — Z13.29 SCREENING FOR THYROID DISORDER: ICD-10-CM

## 2021-10-22 DIAGNOSIS — J45.909 ASTHMA DUE TO ENVIRONMENTAL ALLERGIES: ICD-10-CM

## 2021-10-22 PROCEDURE — 90686 IIV4 VACC NO PRSV 0.5 ML IM: CPT | Performed by: NURSE PRACTITIONER

## 2021-10-22 PROCEDURE — 99395 PREV VISIT EST AGE 18-39: CPT | Performed by: NURSE PRACTITIONER

## 2021-10-22 PROCEDURE — 90471 IMMUNIZATION ADMIN: CPT | Performed by: NURSE PRACTITIONER

## 2021-10-22 RX ORDER — ALBUTEROL SULFATE 90 UG/1
2 AEROSOL, METERED RESPIRATORY (INHALATION) EVERY 4 HOURS PRN
Qty: 18 G | Refills: 11 | Status: SHIPPED | OUTPATIENT
Start: 2021-10-22 | End: 2022-11-11

## 2021-10-25 DIAGNOSIS — J45.909 ASTHMA DUE TO ENVIRONMENTAL ALLERGIES: Primary | ICD-10-CM

## 2021-11-01 ENCOUNTER — OFFICE VISIT (OUTPATIENT)
Dept: OBSTETRICS AND GYNECOLOGY | Facility: CLINIC | Age: 36
End: 2021-11-01

## 2021-11-01 VITALS
BODY MASS INDEX: 43.4 KG/M2 | WEIGHT: 293 LBS | SYSTOLIC BLOOD PRESSURE: 130 MMHG | HEIGHT: 69 IN | DIASTOLIC BLOOD PRESSURE: 70 MMHG

## 2021-11-01 DIAGNOSIS — N63.25 BREAST LUMP ON LEFT SIDE AT 3 O'CLOCK POSITION: ICD-10-CM

## 2021-11-01 DIAGNOSIS — N64.52 NIPPLE DISCHARGE: ICD-10-CM

## 2021-11-01 DIAGNOSIS — N63.21 BREAST LUMP ON LEFT SIDE AT 2 O'CLOCK POSITION: ICD-10-CM

## 2021-11-01 DIAGNOSIS — N64.52 BREAST DISCHARGE: Primary | ICD-10-CM

## 2021-11-01 DIAGNOSIS — N96 HABITUAL ABORTER NOT CURRENTLY PREGNANT: ICD-10-CM

## 2021-11-01 DIAGNOSIS — Z01.419 ROUTINE GYNECOLOGICAL EXAMINATION: Primary | ICD-10-CM

## 2021-11-01 PROCEDURE — 99385 PREV VISIT NEW AGE 18-39: CPT | Performed by: OBSTETRICS & GYNECOLOGY

## 2021-11-01 NOTE — PROGRESS NOTES
GYN Annual Exam     CC - Here for annual exam. Patient is a new patient    Subjective   HPI  Regla Holliday is a 36 y.o. female, , who presents for annual well woman exam.   Patient's last menstrual period was 10/18/2021 (exact date)..  Periods are regular and last 7 days..    Dysmenorrhea:severe, occurring premenstrually and first 1-2 days of flow.  Patient reports problems with: none.      Partner Status: Marital Status: .  New Partners since last visit: no.  Desires STD Screening: no.  HPV vaccinated? : no    H/o miscarriage x 3.  Her doctor at the time said it was b/c she is obese.  She has not had a workup for it.  Recently diagnosed with celiac disease and she wonders if this is why.    Additional OB/GYN History   Current contraception: Paragard IUD inserted   Desires to: continue contraception    Last Pap : several years ago  Last Completed Pap Smear          PAP SMEAR (Every 3 Years) Next due on 2021  SCANNED - PAP SMEAR              History of abnormal Pap smear: no  Family history of uterine, colon, breast, or ovarian cancer: yes - Pat aunts x3 breast ca  Performs monthly Self-Breast Exam: yes  Exercises Regularly:no  Feelings of Anxiety or Depression: yes - treated by PCP  Tobacco Usage?: No   OB History        3    Para        Term                AB   3    Living           SAB   3    IAB        Ectopic        Molar        Multiple        Live Births                    Health Maintenance   Topic Date Due   • ANNUAL PHYSICAL  Never done   • Pneumococcal Vaccine 0-64 (1 of 2 - PPSV23) Never done   • TDAP/TD VACCINES (1 - Tdap) Never done   • HEPATITIS C SCREENING  Never done   • Annual Gynecologic Pelvic and Breast Exam  2022   • PAP SMEAR  2024   • COVID-19 Vaccine  Completed   • INFLUENZA VACCINE  Completed       The additional following portions of the patient's history were reviewed and updated as appropriate: problem list.    Review of  "Systems   All other systems reviewed and are negative.      I have reviewed and agree with the HPI, ROS, and historical information as entered above. Blanca Archibald MD    Objective   /70   Ht 175.3 cm (69\")   Wt (!) 144 kg (317 lb)   LMP 10/18/2021 (Exact Date)   Breastfeeding No Comment: Paragard  BMI 46.81 kg/m²     Physical Exam    General:  well developed; obese, no acute distress  Skin:  No suspicious lesions seen  Thyroid: normal to inspection and palpation  Breasts:  Examined in supine position  Symmetric without skin dimpling, Mass on left ~ 3oclock, ~2-3cm, ill defined  Nipples normal without inversion, lesions, left nipple discharge, clear/milky  There are no palpable axillary nodes  CVS: RRR, no M/R/G, distal pulses wnl  Resp: CTAB, No W/R/R  Abdomen: soft, non-tender; no masses  no umbilical or inguinal hernias are present  no hepato-splenomegaly  Pelvis: Clinical staff was present for exam  External genitalia:  normal appearance of the external genitalia including Bartholin's and [place]'s glands.  Urethra: no masses or tenderness  Urethral meatus: normal size;  No lesions or signs of prolapse  Bladder: non tender to palpation, no masses, no prolapse  Vagina:  normal pink mucosa without prolapse or lesions.  Cervix:  normal appearance.  Uterus:  normal size, shape and consistency.  Adnexa:  normal bimanual exam of the adnexa.  Perineum/Anus: normal appearance, no external hemorrhoids  Ext: 2+ pulses, no edema    Assessment/Plan     Assessment     Problem List Items Addressed This Visit     Nipple discharge    Relevant Orders    Non-gynecologic Cytology    Habitual aborter not currently pregnant    Relevant Orders    Prolactin    Anticardiolipin Antibody, IgG / M, Qn    Lupus Anticoagulant Panel    Von Willebrand Screen    Antithrombin III    Protein C & S, Functional    Routine gynecological examination - Primary    Relevant Orders    Liquid-based Pap Smear, Screening    Breast lump on left " side at 3 o'clock position          1. GYN annual well woman exam.     Plan     1. Reviewed Pap screening guidelines  2. Pap with HPV done/declines STD screening  3. Reviewed monthly self breast exams.  Instructed to call with lumps, pain, or breast discharge.    4. Reviewed BMI and weight loss as preventative health measures.   5. Reviewed exercise and healthy diet as a preventative health measures.   6. RTC in 1 year or PRN with problems  7. Other: Nipple smear   8. Patient also has never had labs for multiple miscarriages.  We discussed preconception planning as she may want to try when Paragard due to be removed.      Blanca Archibald MD  11/01/2021

## 2021-11-02 ENCOUNTER — TELEPHONE (OUTPATIENT)
Dept: FAMILY MEDICINE CLINIC | Facility: CLINIC | Age: 36
End: 2021-11-02

## 2021-11-02 NOTE — PROGRESS NOTES
Diagnostic center is requesting diagnostic mammogram order.     Ordered.   Joelle Mcpherson, APRN

## 2021-11-02 NOTE — TELEPHONE ENCOUNTER
----- Message from JAILENE Bruce sent at 11/2/2021  1:22 PM EDT -----  Regarding: aida Chambers, can you check on this?   Shital Lai   ----- Message -----  From: Vel Leahy MA  Sent: 11/1/2021   6:30 PM EDT  To: JAILENE Bruce  Subject: FW: Breo                                           ----- Message -----  From: Regla Holliday  Sent: 11/1/2021   5:52 PM EDT  [facility]  Subject: Breo                                             Thank you. I also have not been contacted to schedule the Ultrasound yet.

## 2021-11-02 NOTE — TELEPHONE ENCOUNTER
LVM INFORMING PT THAT PER BREAST CENTER'S PROTOCOL, THEY REQUIRE A DX BILATERAL MAMMO BEFORE THEY WILL DO ULTRASOUND DUE TO PT AGE. THIS HAS BEEN ORDERED AND SENT TO THE SCHEDULERS AT THE BREAST CENTER. INFORMED HER THAT SHE SHOULD RECIEVE A CALL IN THE NEXT WEEK TO SCHEDULE.

## 2021-11-03 ENCOUNTER — LAB (OUTPATIENT)
Dept: LAB | Facility: HOSPITAL | Age: 36
End: 2021-11-03

## 2021-11-03 ENCOUNTER — LAB (OUTPATIENT)
Dept: FAMILY MEDICINE CLINIC | Facility: CLINIC | Age: 36
End: 2021-11-03

## 2021-11-03 DIAGNOSIS — N64.52 NIPPLE DISCHARGE: Primary | ICD-10-CM

## 2021-11-03 DIAGNOSIS — Z11.59 ENCOUNTER FOR HEPATITIS C SCREENING TEST FOR LOW RISK PATIENT: ICD-10-CM

## 2021-11-03 DIAGNOSIS — Z13.1 SCREENING FOR DIABETES MELLITUS (DM): ICD-10-CM

## 2021-11-03 DIAGNOSIS — Z00.00 ENCOUNTER FOR MEDICAL EXAMINATION TO ESTABLISH CARE: ICD-10-CM

## 2021-11-03 DIAGNOSIS — N96 HABITUAL ABORTER NOT CURRENTLY PREGNANT: ICD-10-CM

## 2021-11-03 LAB
25(OH)D3 SERPL-MCNC: 33.3 NG/ML (ref 30–100)
ALBUMIN SERPL-MCNC: 4.5 G/DL (ref 3.5–5.2)
ALBUMIN/GLOB SERPL: 1.8 G/DL
ALP SERPL-CCNC: 112 U/L (ref 39–117)
ALT SERPL W P-5'-P-CCNC: 30 U/L (ref 1–33)
ANION GAP SERPL CALCULATED.3IONS-SCNC: 9.7 MMOL/L (ref 5–15)
AST SERPL-CCNC: 24 U/L (ref 1–32)
BASOPHILS # BLD AUTO: 0.04 10*3/MM3 (ref 0–0.2)
BASOPHILS NFR BLD AUTO: 0.5 % (ref 0–1.5)
BILIRUB SERPL-MCNC: 0.4 MG/DL (ref 0–1.2)
BUN SERPL-MCNC: 10 MG/DL (ref 6–20)
BUN/CREAT SERPL: 11.1 (ref 7–25)
CALCIUM SPEC-SCNC: 9.6 MG/DL (ref 8.6–10.5)
CHLORIDE SERPL-SCNC: 104 MMOL/L (ref 98–107)
CHOLEST SERPL-MCNC: 210 MG/DL (ref 0–200)
CO2 SERPL-SCNC: 24.3 MMOL/L (ref 22–29)
CREAT SERPL-MCNC: 0.9 MG/DL (ref 0.57–1)
DEPRECATED RDW RBC AUTO: 40.3 FL (ref 37–54)
EOSINOPHIL # BLD AUTO: 0.15 10*3/MM3 (ref 0–0.4)
EOSINOPHIL NFR BLD AUTO: 2.1 % (ref 0.3–6.2)
ERYTHROCYTE [DISTWIDTH] IN BLOOD BY AUTOMATED COUNT: 13.2 % (ref 12.3–15.4)
GFR SERPL CREATININE-BSD FRML MDRD: 72 ML/MIN/1.73
GLOBULIN UR ELPH-MCNC: 2.5 GM/DL
GLUCOSE SERPL-MCNC: 91 MG/DL (ref 65–99)
HAV IGM SERPL QL IA: NORMAL
HBA1C MFR BLD: 5 % (ref 4.8–5.6)
HBV CORE IGM SERPL QL IA: NORMAL
HBV SURFACE AG SERPL QL IA: NORMAL
HCT VFR BLD AUTO: 39.5 % (ref 34–46.6)
HCV AB SER DONR QL: NORMAL
HDLC SERPL-MCNC: 73 MG/DL (ref 40–60)
HGB BLD-MCNC: 13.6 G/DL (ref 12–15.9)
HIV1+2 AB SER QL: NORMAL
IMM GRANULOCYTES # BLD AUTO: 0.02 10*3/MM3 (ref 0–0.05)
IMM GRANULOCYTES NFR BLD AUTO: 0.3 % (ref 0–0.5)
LDLC SERPL CALC-MCNC: 112 MG/DL (ref 0–100)
LDLC/HDLC SERPL: 1.47 {RATIO}
LYMPHOCYTES # BLD AUTO: 2.4 10*3/MM3 (ref 0.7–3.1)
LYMPHOCYTES NFR BLD AUTO: 32.9 % (ref 19.6–45.3)
MCH RBC QN AUTO: 29.6 PG (ref 26.6–33)
MCHC RBC AUTO-ENTMCNC: 34.4 G/DL (ref 31.5–35.7)
MCV RBC AUTO: 85.9 FL (ref 79–97)
MONOCYTES # BLD AUTO: 0.39 10*3/MM3 (ref 0.1–0.9)
MONOCYTES NFR BLD AUTO: 5.3 % (ref 5–12)
NEUTROPHILS NFR BLD AUTO: 4.3 10*3/MM3 (ref 1.7–7)
NEUTROPHILS NFR BLD AUTO: 58.9 % (ref 42.7–76)
NRBC BLD AUTO-RTO: 0 /100 WBC (ref 0–0.2)
PLATELET # BLD AUTO: 283 10*3/MM3 (ref 140–450)
PMV BLD AUTO: 9.7 FL (ref 6–12)
POTASSIUM SERPL-SCNC: 4.6 MMOL/L (ref 3.5–5.2)
PROLACTIN SERPL-MCNC: 23.5 NG/ML (ref 4.79–23.3)
PROT SERPL-MCNC: 7 G/DL (ref 6–8.5)
RBC # BLD AUTO: 4.6 10*6/MM3 (ref 3.77–5.28)
SODIUM SERPL-SCNC: 138 MMOL/L (ref 136–145)
T4 SERPL-MCNC: 8.72 MCG/DL (ref 4.5–11.7)
TRIGL SERPL-MCNC: 147 MG/DL (ref 0–150)
TSH SERPL DL<=0.05 MIU/L-ACNC: 3.51 UIU/ML (ref 0.27–4.2)
VLDLC SERPL-MCNC: 25 MG/DL (ref 5–40)
WBC # BLD AUTO: 7.3 10*3/MM3 (ref 3.4–10.8)

## 2021-11-03 PROCEDURE — 85598 HEXAGNAL PHOSPH PLTLT NEUTRL: CPT

## 2021-11-03 PROCEDURE — 85246 CLOT FACTOR VIII VW ANTIGEN: CPT

## 2021-11-03 PROCEDURE — 85670 THROMBIN TIME PLASMA: CPT

## 2021-11-03 PROCEDURE — 80074 ACUTE HEPATITIS PANEL: CPT | Performed by: NURSE PRACTITIONER

## 2021-11-03 PROCEDURE — 86146 BETA-2 GLYCOPROTEIN ANTIBODY: CPT

## 2021-11-03 PROCEDURE — 80053 COMPREHEN METABOLIC PANEL: CPT | Performed by: NURSE PRACTITIONER

## 2021-11-03 PROCEDURE — 84436 ASSAY OF TOTAL THYROXINE: CPT | Performed by: NURSE PRACTITIONER

## 2021-11-03 PROCEDURE — 84146 ASSAY OF PROLACTIN: CPT

## 2021-11-03 PROCEDURE — 85300 ANTITHROMBIN III ACTIVITY: CPT

## 2021-11-03 PROCEDURE — 85732 THROMBOPLASTIN TIME PARTIAL: CPT

## 2021-11-03 PROCEDURE — 83036 HEMOGLOBIN GLYCOSYLATED A1C: CPT | Performed by: NURSE PRACTITIONER

## 2021-11-03 PROCEDURE — 85597 PHOSPHOLIPID PLTLT NEUTRALIZ: CPT

## 2021-11-03 PROCEDURE — 85306 CLOT INHIBIT PROT S FREE: CPT

## 2021-11-03 PROCEDURE — 85730 THROMBOPLASTIN TIME PARTIAL: CPT

## 2021-11-03 PROCEDURE — 85240 CLOT FACTOR VIII AHG 1 STAGE: CPT

## 2021-11-03 PROCEDURE — 85303 CLOT INHIBIT PROT C ACTIVITY: CPT

## 2021-11-03 PROCEDURE — 81001 URINALYSIS AUTO W/SCOPE: CPT | Performed by: NURSE PRACTITIONER

## 2021-11-03 PROCEDURE — 85613 RUSSELL VIPER VENOM DILUTED: CPT

## 2021-11-03 PROCEDURE — 86147 CARDIOLIPIN ANTIBODY EA IG: CPT

## 2021-11-03 PROCEDURE — 82306 VITAMIN D 25 HYDROXY: CPT | Performed by: NURSE PRACTITIONER

## 2021-11-03 PROCEDURE — 85025 COMPLETE CBC W/AUTO DIFF WBC: CPT | Performed by: NURSE PRACTITIONER

## 2021-11-03 PROCEDURE — 85610 PROTHROMBIN TIME: CPT

## 2021-11-03 PROCEDURE — G0432 EIA HIV-1/HIV-2 SCREEN: HCPCS | Performed by: NURSE PRACTITIONER

## 2021-11-03 PROCEDURE — 85245 CLOT FACTOR VIII VW RISTOCTN: CPT

## 2021-11-03 PROCEDURE — 84443 ASSAY THYROID STIM HORMONE: CPT | Performed by: NURSE PRACTITIONER

## 2021-11-03 PROCEDURE — 36415 COLL VENOUS BLD VENIPUNCTURE: CPT

## 2021-11-03 PROCEDURE — 80061 LIPID PANEL: CPT | Performed by: NURSE PRACTITIONER

## 2021-11-03 NOTE — PROGRESS NOTES
May be a papilloma.  F/u with a breast surgeon for their opinion after imiaging already ordered.  I'll put in referral

## 2021-11-04 VITALS
TEMPERATURE: 97 F | OXYGEN SATURATION: 97 % | BODY MASS INDEX: 43.4 KG/M2 | WEIGHT: 293 LBS | HEART RATE: 78 BPM | RESPIRATION RATE: 18 BRPM | HEIGHT: 69 IN | SYSTOLIC BLOOD PRESSURE: 106 MMHG | DIASTOLIC BLOOD PRESSURE: 58 MMHG

## 2021-11-04 DIAGNOSIS — N63.0 BREAST MASS IN FEMALE: Primary | ICD-10-CM

## 2021-11-04 LAB
BACTERIA UR QL AUTO: NORMAL /HPF
BILIRUB UR QL STRIP: NEGATIVE
CARDIOLIPIN IGG SER IA-ACNC: <9 GPL U/ML (ref 0–14)
CARDIOLIPIN IGM SER IA-ACNC: <9 MPL U/ML (ref 0–12)
CLARITY UR: CLEAR
COLOR UR: YELLOW
GLUCOSE UR STRIP-MCNC: NEGATIVE MG/DL
HGB UR QL STRIP.AUTO: NEGATIVE
HYALINE CASTS UR QL AUTO: NORMAL /LPF
KETONES UR QL STRIP: NEGATIVE
LEUKOCYTE ESTERASE UR QL STRIP.AUTO: ABNORMAL
NITRITE UR QL STRIP: NEGATIVE
PH UR STRIP.AUTO: 5.5 [PH] (ref 5–8)
PROT UR QL STRIP: NEGATIVE
RBC # UR: NORMAL /HPF
REF LAB TEST METHOD: NORMAL
SP GR UR STRIP: 1.01 (ref 1–1.03)
SQUAMOUS #/AREA URNS HPF: NORMAL /HPF
UROBILINOGEN UR QL STRIP: ABNORMAL
WBC UR QL AUTO: NORMAL /HPF

## 2021-11-05 LAB
PROT C ACT/NOR PPP: 152 % (ref 73–180)
PROT S ACT/NOR PPP: 79 % (ref 63–140)

## 2021-11-06 LAB — AT III ACT/NOR PPP CHRO: 120 % (ref 75–135)

## 2021-11-22 LAB
APTT HEX PL PPP: 0 SEC
APTT IMM NP PPP: NORMAL S
APTT PPP 1:1 SALINE: NORMAL S
APTT PPP: 24.2 SEC
B2 GLYCOPROT1 IGA SER-ACNC: <10 SAU
B2 GLYCOPROT1 IGG SER-ACNC: <10 SGU
B2 GLYCOPROT1 IGM SER-ACNC: <10 SMU
CARDIOLIPIN IGG SER IA-ACNC: <10 GPL
CARDIOLIPIN IGM SER IA-ACNC: 11 MPL
CONFIRM APTT: 0 SEC
CONFIRM DRVVT: NORMAL S
DRVVT SCREEN TO CONFIRM RATIO: NORMAL {RATIO}
INR PPP: 0.9 RATIO
LABORATORY COMMENT REPORT: NORMAL
PROTHROMBIN TIME: 9.7 SEC
SCREEN DRVVT: 37.2 SEC
THROMBIN TIME: 16.8 SEC

## 2021-11-24 LAB
APTT PPP: 25.1 SEC
FACT VIII ACT/NOR PPP: 148 %
VWF AG ACT/NOR PPP IA: 157 %
VWF:RCO ACT/NOR PPP PL AGG: 143 %

## 2021-11-30 ENCOUNTER — CLINICAL SUPPORT (OUTPATIENT)
Dept: GENETICS | Facility: HOSPITAL | Age: 36
End: 2021-11-30

## 2021-11-30 ENCOUNTER — LAB (OUTPATIENT)
Dept: LAB | Facility: HOSPITAL | Age: 36
End: 2021-11-30

## 2021-11-30 DIAGNOSIS — C50.912 MALIGNANT NEOPLASM OF LEFT BREAST IN FEMALE, ESTROGEN RECEPTOR POSITIVE, UNSPECIFIED SITE OF BREAST (HCC): ICD-10-CM

## 2021-11-30 DIAGNOSIS — Z80.41 FAMILY HISTORY OF MALIGNANT NEOPLASM OF OVARY: ICD-10-CM

## 2021-11-30 DIAGNOSIS — Z13.79 GENETIC TESTING: Primary | ICD-10-CM

## 2021-11-30 DIAGNOSIS — C50.012 MALIGNANT NEOPLASM OF NIPPLE OF LEFT BREAST IN FEMALE, ESTROGEN RECEPTOR POSITIVE (HCC): Primary | ICD-10-CM

## 2021-11-30 DIAGNOSIS — Z80.3 FAMILY HISTORY OF MALIGNANT NEOPLASM OF BREAST: ICD-10-CM

## 2021-11-30 DIAGNOSIS — Z17.0 MALIGNANT NEOPLASM OF NIPPLE OF LEFT BREAST IN FEMALE, ESTROGEN RECEPTOR POSITIVE (HCC): Primary | ICD-10-CM

## 2021-11-30 DIAGNOSIS — Z17.0 MALIGNANT NEOPLASM OF LEFT BREAST IN FEMALE, ESTROGEN RECEPTOR POSITIVE, UNSPECIFIED SITE OF BREAST (HCC): ICD-10-CM

## 2021-11-30 PROCEDURE — 96040: CPT | Performed by: GENETIC COUNSELOR, MS

## 2021-12-01 ENCOUNTER — APPOINTMENT (OUTPATIENT)
Dept: GENERAL RADIOLOGY | Facility: HOSPITAL | Age: 36
End: 2021-12-01

## 2021-12-01 ENCOUNTER — HOSPITAL ENCOUNTER (EMERGENCY)
Facility: HOSPITAL | Age: 36
Discharge: HOME | End: 2021-12-01
Attending: EMERGENCY MEDICINE | Admitting: EMERGENCY MEDICINE

## 2021-12-01 VITALS
BODY MASS INDEX: 43.4 KG/M2 | SYSTOLIC BLOOD PRESSURE: 126 MMHG | OXYGEN SATURATION: 98 % | WEIGHT: 293 LBS | RESPIRATION RATE: 16 BRPM | TEMPERATURE: 97.7 F | DIASTOLIC BLOOD PRESSURE: 96 MMHG | HEIGHT: 69 IN | HEART RATE: 92 BPM

## 2021-12-01 DIAGNOSIS — C50.919 MALIGNANT NEOPLASM OF FEMALE BREAST, UNSPECIFIED ESTROGEN RECEPTOR STATUS, UNSPECIFIED LATERALITY, UNSPECIFIED SITE OF BREAST (HCC): ICD-10-CM

## 2021-12-01 DIAGNOSIS — F43.0 PANIC STATE AS ACUTE REACTION TO STRESS: Primary | ICD-10-CM

## 2021-12-01 LAB
ALBUMIN SERPL-MCNC: 4.6 G/DL (ref 3.5–5.2)
ALBUMIN/GLOB SERPL: 1.7 G/DL
ALP SERPL-CCNC: 104 U/L (ref 39–117)
ALT SERPL W P-5'-P-CCNC: 41 U/L (ref 1–33)
ANION GAP SERPL CALCULATED.3IONS-SCNC: 11 MMOL/L (ref 5–15)
AST SERPL-CCNC: 29 U/L (ref 1–32)
BASOPHILS # BLD AUTO: 0.06 10*3/MM3 (ref 0–0.2)
BASOPHILS NFR BLD AUTO: 0.6 % (ref 0–1.5)
BILIRUB SERPL-MCNC: 0.4 MG/DL (ref 0–1.2)
BUN SERPL-MCNC: 16 MG/DL (ref 6–20)
BUN/CREAT SERPL: 14.8 (ref 7–25)
CALCIUM SPEC-SCNC: 9.6 MG/DL (ref 8.6–10.5)
CHLORIDE SERPL-SCNC: 102 MMOL/L (ref 98–107)
CO2 SERPL-SCNC: 24 MMOL/L (ref 22–29)
CREAT SERPL-MCNC: 1.08 MG/DL (ref 0.57–1)
D DIMER PPP FEU-MCNC: 0.36 MCGFEU/ML (ref 0–0.56)
DEPRECATED RDW RBC AUTO: 42.5 FL (ref 37–54)
EOSINOPHIL # BLD AUTO: 0.19 10*3/MM3 (ref 0–0.4)
EOSINOPHIL NFR BLD AUTO: 2 % (ref 0.3–6.2)
ERYTHROCYTE [DISTWIDTH] IN BLOOD BY AUTOMATED COUNT: 13 % (ref 12.3–15.4)
GFR SERPL CREATININE-BSD FRML MDRD: 58 ML/MIN/1.73
GLOBULIN UR ELPH-MCNC: 2.7 GM/DL
GLUCOSE SERPL-MCNC: 102 MG/DL (ref 65–99)
HCT VFR BLD AUTO: 40.6 % (ref 34–46.6)
HGB BLD-MCNC: 13.2 G/DL (ref 12–15.9)
HOLD SPECIMEN: NORMAL
IMM GRANULOCYTES # BLD AUTO: 0.03 10*3/MM3 (ref 0–0.05)
IMM GRANULOCYTES NFR BLD AUTO: 0.3 % (ref 0–0.5)
LYMPHOCYTES # BLD AUTO: 3.32 10*3/MM3 (ref 0.7–3.1)
LYMPHOCYTES NFR BLD AUTO: 35.4 % (ref 19.6–45.3)
MCH RBC QN AUTO: 28.9 PG (ref 26.6–33)
MCHC RBC AUTO-ENTMCNC: 32.5 G/DL (ref 31.5–35.7)
MCV RBC AUTO: 88.8 FL (ref 79–97)
MONOCYTES # BLD AUTO: 0.66 10*3/MM3 (ref 0.1–0.9)
MONOCYTES NFR BLD AUTO: 7 % (ref 5–12)
NEUTROPHILS NFR BLD AUTO: 5.13 10*3/MM3 (ref 1.7–7)
NEUTROPHILS NFR BLD AUTO: 54.7 % (ref 42.7–76)
NRBC BLD AUTO-RTO: 0 /100 WBC (ref 0–0.2)
NT-PROBNP SERPL-MCNC: 110.8 PG/ML (ref 0–450)
PLATELET # BLD AUTO: 256 10*3/MM3 (ref 140–450)
PMV BLD AUTO: 9.2 FL (ref 6–12)
POTASSIUM SERPL-SCNC: 4.6 MMOL/L (ref 3.5–5.2)
PROT SERPL-MCNC: 7.3 G/DL (ref 6–8.5)
RBC # BLD AUTO: 4.57 10*6/MM3 (ref 3.77–5.28)
SODIUM SERPL-SCNC: 137 MMOL/L (ref 136–145)
T4 FREE SERPL-MCNC: 1.27 NG/DL (ref 0.93–1.7)
TROPONIN T SERPL-MCNC: <0.01 NG/ML (ref 0–0.03)
TSH SERPL DL<=0.05 MIU/L-ACNC: 3.19 UIU/ML (ref 0.27–4.2)
WBC NRBC COR # BLD: 9.39 10*3/MM3 (ref 3.4–10.8)
WHOLE BLOOD HOLD SPECIMEN: NORMAL
WHOLE BLOOD HOLD SPECIMEN: NORMAL

## 2021-12-01 PROCEDURE — 71045 X-RAY EXAM CHEST 1 VIEW: CPT

## 2021-12-01 PROCEDURE — 36415 COLL VENOUS BLD VENIPUNCTURE: CPT

## 2021-12-01 PROCEDURE — 83880 ASSAY OF NATRIURETIC PEPTIDE: CPT | Performed by: EMERGENCY MEDICINE

## 2021-12-01 PROCEDURE — 99283 EMERGENCY DEPT VISIT LOW MDM: CPT

## 2021-12-01 PROCEDURE — 84439 ASSAY OF FREE THYROXINE: CPT | Performed by: PHYSICIAN ASSISTANT

## 2021-12-01 PROCEDURE — 93005 ELECTROCARDIOGRAM TRACING: CPT

## 2021-12-01 PROCEDURE — 85025 COMPLETE CBC W/AUTO DIFF WBC: CPT

## 2021-12-01 PROCEDURE — 85379 FIBRIN DEGRADATION QUANT: CPT | Performed by: PHYSICIAN ASSISTANT

## 2021-12-01 PROCEDURE — 93005 ELECTROCARDIOGRAM TRACING: CPT | Performed by: EMERGENCY MEDICINE

## 2021-12-01 PROCEDURE — 84484 ASSAY OF TROPONIN QUANT: CPT | Performed by: EMERGENCY MEDICINE

## 2021-12-01 PROCEDURE — 84443 ASSAY THYROID STIM HORMONE: CPT | Performed by: PHYSICIAN ASSISTANT

## 2021-12-01 PROCEDURE — 80053 COMPREHEN METABOLIC PANEL: CPT | Performed by: EMERGENCY MEDICINE

## 2021-12-01 RX ORDER — SODIUM CHLORIDE 0.9 % (FLUSH) 0.9 %
10 SYRINGE (ML) INJECTION AS NEEDED
Status: DISCONTINUED | OUTPATIENT
Start: 2021-12-01 | End: 2021-12-02 | Stop reason: HOSPADM

## 2021-12-01 RX ORDER — DIAZEPAM 5 MG/1
10 TABLET ORAL ONCE
Status: COMPLETED | OUTPATIENT
Start: 2021-12-01 | End: 2021-12-01

## 2021-12-01 RX ADMIN — DIAZEPAM 10 MG: 5 TABLET ORAL at 20:58

## 2021-12-02 NOTE — ED PROVIDER NOTES
EMERGENCY DEPARTMENT ENCOUNTER    Pt Name: Regla Holliday  MRN: [id number]  Pt :   1985  Room Number:  TRI1/T1  Date of encounter:  2021  PCP: Joelle Mcpherson APRN  ED Provider: Linus Echeverria PA-C    Historian: Patient      HPI:  Chief Complaint: Anxiety, panic attack, shortness of breath, palpitations        Context: Regla Holliday is a 36 y.o. female who presents to the ED c/o panic attack tonight, with associated PVCs and shortness of breath.  Patient states she recently found out that she has breast cancer.  She is still in the process of mapping out her treatment plan.  She is quite anxious, has a history of panic attacks, states tonight she had a panic attack which is kept escalating and was associated with palpitations.  She has a history of PVCs for which she is followed by JAILENE Cano.  No chest pain arm neck jaw shoulder pain.  No syncope, but she did have an episode tonight during her panic attack in which she thought she would pass out, blood pressure taken at that time was 88 systolic.      PAST MEDICAL HISTORY  Past Medical History:   Diagnosis Date   • ADHD    • Celiac disease    • Panic attacks          PAST SURGICAL HISTORY  Past Surgical History:   Procedure Laterality Date   • MOUTH SURGERY     • NASAL SEPTUM SURGERY     • TONSILLECTOMY AND ADENOIDECTOMY           FAMILY HISTORY  Family History   Problem Relation Age of Onset   • Kidney disease Mother    • Hypertension Mother    • Hypothyroidism Mother    • Arrhythmia Father    • Heart failure Father    • Hypertension Father    • Hyperlipidemia Father    • Bone's esophagus Father    • Diabetes Father    • Anxiety disorder Sister    • COPD Maternal Grandmother    • Heart failure Maternal Grandmother    • Osteoporosis Maternal Grandmother    • Heart attack Maternal Grandfather    • Leukemia Paternal Grandmother    • Diabetes Paternal Grandfather    • Other Paternal Grandfather         heart problems   • Breast  cancer Paternal Aunt          SOCIAL HISTORY  Social History     Socioeconomic History   • Marital status:    • Number of children: 0   Tobacco Use   • Smoking status: Former Smoker     Quit date: 2020     Years since quittin.2   • Smokeless tobacco: Never Used   Substance and Sexual Activity   • Alcohol use: Yes   • Drug use: Defer   • Sexual activity: Yes     Partners: Male     Birth control/protection: I.U.D.     Comment: Paragard IUD since          ALLERGIES  Ibuprofen and Naproxen        REVIEW OF SYSTEMS  Review of Systems   Constitutional: Positive for activity change. Negative for appetite change, chills, fatigue and fever.   HENT: Negative for congestion, rhinorrhea and sore throat.    Respiratory: Positive for shortness of breath. Negative for cough, chest tightness and wheezing.    Cardiovascular: Positive for palpitations. Negative for chest pain.   Gastrointestinal: Negative for abdominal pain, nausea and vomiting.   Genitourinary: Negative for dysuria, flank pain and hematuria.   Musculoskeletal: Negative for back pain, myalgias and neck pain.   Neurological: Positive for dizziness. Negative for seizures, syncope and headaches.              PHYSICAL EXAM    I have reviewed the triage vital signs and nursing notes.    ED Triage Vitals [21]   Temp Heart Rate Resp BP SpO2   97.7 °F (36.5 °C) 99 20 132/74 98 %      Temp src Heart Rate Source Patient Position BP Location FiO2 (%)   Oral Monitor Sitting Right arm --       Physical Exam  GENERAL:   Tearful, anxious, awake alert oriented nontoxic-appearing and not in acute distress.  HENT: Nares patent.  EYES: No scleral icterus.  CV: Regular rhythm, regular rate.  Occasional irregular beat noted.  No murmurs rubs or gallops.  RESPIRATORY: Normal effort.  No audible wheezes, rales or rhonchi.  ABDOMEN: Soft, nontender  MUSCULOSKELETAL: No deformities.   NEURO: Alert, moves all extremities, follows commands.  SKIN: Warm, dry, no  rash visualized.        LAB RESULTS  Recent Results (from the past 24 hour(s))   Comprehensive Metabolic Panel    Collection Time: 12/01/21  7:38 PM    Specimen: Blood   Result Value Ref Range    Glucose 102 (H) 65 - 99 mg/dL    BUN 16 6 - 20 mg/dL    Creatinine 1.08 (H) 0.57 - 1.00 mg/dL    Sodium 137 136 - 145 mmol/L    Potassium 4.6 3.5 - 5.2 mmol/L    Chloride 102 98 - 107 mmol/L    CO2 24.0 22.0 - 29.0 mmol/L    Calcium 9.6 8.6 - 10.5 mg/dL    Total Protein 7.3 6.0 - 8.5 g/dL    Albumin 4.60 3.50 - 5.20 g/dL    ALT (SGPT) 41 (H) 1 - 33 U/L    AST (SGOT) 29 1 - 32 U/L    Alkaline Phosphatase 104 39 - 117 U/L    Total Bilirubin 0.4 0.0 - 1.2 mg/dL    eGFR Non African Amer 58 (L) >60 mL/min/1.73    Globulin 2.7 gm/dL    A/G Ratio 1.7 g/dL    BUN/Creatinine Ratio 14.8 7.0 - 25.0    Anion Gap 11.0 5.0 - 15.0 mmol/L   BNP    Collection Time: 12/01/21  7:38 PM    Specimen: Blood   Result Value Ref Range    proBNP 110.8 0.0 - 450.0 pg/mL   Troponin    Collection Time: 12/01/21  7:38 PM    Specimen: Blood   Result Value Ref Range    Troponin T <0.010 0.000 - 0.030 ng/mL   Green Top (Gel)    Collection Time: 12/01/21  7:38 PM   Result Value Ref Range    Extra Tube Hold for add-ons.    Lavender Top    Collection Time: 12/01/21  7:38 PM   Result Value Ref Range    Extra Tube hold for add-on    Gold Top - SST    Collection Time: 12/01/21  7:38 PM   Result Value Ref Range    Extra Tube Hold for add-ons.    Light Blue Top    Collection Time: 12/01/21  7:38 PM   Result Value Ref Range    Extra Tube hold for add-on    CBC Auto Differential    Collection Time: 12/01/21  7:38 PM    Specimen: Blood   Result Value Ref Range    WBC 9.39 3.40 - 10.80 10*3/mm3    RBC 4.57 3.77 - 5.28 10*6/mm3    Hemoglobin 13.2 12.0 - 15.9 g/dL    Hematocrit 40.6 34.0 - 46.6 %    MCV 88.8 79.0 - 97.0 fL    MCH 28.9 26.6 - 33.0 pg    MCHC 32.5 31.5 - 35.7 g/dL    RDW 13.0 12.3 - 15.4 %    RDW-SD 42.5 37.0 - 54.0 fl    MPV 9.2 6.0 - 12.0 fL    Platelets  256 140 - 450 10*3/mm3    Neutrophil % 54.7 42.7 - 76.0 %    Lymphocyte % 35.4 19.6 - 45.3 %    Monocyte % 7.0 5.0 - 12.0 %    Eosinophil % 2.0 0.3 - 6.2 %    Basophil % 0.6 0.0 - 1.5 %    Immature Grans % 0.3 0.0 - 0.5 %    Neutrophils, Absolute 5.13 1.70 - 7.00 10*3/mm3    Lymphocytes, Absolute 3.32 (H) 0.70 - 3.10 10*3/mm3    Monocytes, Absolute 0.66 0.10 - 0.90 10*3/mm3    Eosinophils, Absolute 0.19 0.00 - 0.40 10*3/mm3    Basophils, Absolute 0.06 0.00 - 0.20 10*3/mm3    Immature Grans, Absolute 0.03 0.00 - 0.05 10*3/mm3    nRBC 0.0 0.0 - 0.2 /100 WBC   TSH    Collection Time: 12/01/21  7:38 PM    Specimen: Blood   Result Value Ref Range    TSH 3.190 0.270 - 4.200 uIU/mL   T4, Free    Collection Time: 12/01/21  7:38 PM    Specimen: Blood   Result Value Ref Range    Free T4 1.27 0.93 - 1.70 ng/dL   D-dimer, Quantitative    Collection Time: 12/01/21  7:38 PM    Specimen: Blood   Result Value Ref Range    D-Dimer, Quantitative 0.36 0.00 - 0.56 MCGFEU/mL       If labs were ordered, I independently reviewed the results.        RADIOLOGY  XR Chest 1 View    Result Date: 12/1/2021  CR Chest 1 Vw INDICATION: Short of air COMPARISON:  Chest x-ray 1/24/2021 FINDINGS: Portable AP view(s) of the chest.  Study is limited by the patient's obesity. Lung volumes are low and there is likely mild cardiac silhouette enlargement. There is at least some crowding of the parenchymal markings with likely mild bibasilar atelectasis. No acute congestive heart failure or pneumothorax.     1. Low lung volumes with likely mild bibasilar atelectasis. If symptoms persist, recommend correlation with a follow-up PA lateral chest x-ray to exclude an infiltrate. 2. Mild cardiac silhouette enlargement but no acute congestive heart failure suspected Signer Name: Palmira Chapman MD  Signed: 12/1/2021 8:58 PM  Workstation Name: [facility]  [facility]    MAMMO Outside Films    Result Date: 12/1/2021  This procedure was  auto-finalized with no dictation required.    MAMMO Outside Films    Result Date: 12/1/2021  This procedure was auto-finalized with no dictation required.          PROCEDURES    Procedures    ECG 12 Lead             MEDICATIONS GIVEN IN ER    Medications   sodium chloride 0.9 % flush 10 mL (has no administration in time range)   diazePAM (VALIUM) tablet 10 mg (10 mg Oral Given 12/1/21 2058)         PROGRESS, DATA ANALYSIS, CONSULTS, AND MEDICAL DECISION MAKING    All labs have been independently reviewed by me.  All radiology studies have been reviewed by me and the radiologist dictating the report.   EKG's have been independently viewed and interpreted by me.                 Work-up here is encouraging, she does have a few PVCs on her EKG.  D-dimer, troponin, BNP, TSH, and T4 are all normal.  She does have a slight bump of her creatinine and GFR is 58.  Looking back over the notes it appears as though her cardiology NP recommended a trial of metoprolol 25 mg at bedtime.  Patient states she did not want to try that at the time because she thought quitting her Adderall would resolve the symptoms, which has helped.  However I do think it is appropriate to trial metoprolol 25 mg at bedtime.  She has a prescription of Xanax 0.5 mg at home.  We discussed how to appropriately take this to help reduce her anxiety.  She is to follow-up with her PCP tomorrow to discuss ongoing anxiolysis.  Signs and symptoms of worsening were reviewed and she was encouraged to return to the ER if she has any change or worsening of symptoms.  She verbalizes understanding and is agreeable to plan.      AS OF 21:51 EST VITALS:    BP - 132/74  HR - 99  TEMP - 97.7 °F (36.5 °C) (Oral)  O2 SATS - 98%                  DIAGNOSIS  Final diagnoses:   Panic state as acute reaction to stress         DISPOSITION  DISCHARGE    Patient discharged in stable condition.    Reviewed implications of results, diagnosis, meds, responsibility to follow up, warning  signs and symptoms of possible worsening, potential complications and reasons to return to ER.    Patient/Family voiced understanding of above instructions.    Discussed plan for discharge, as there is no emergent indication for admission.  Pt/family is agreeable and understands need for follow up and possible repeat testing.  Pt/family is aware that discharge does not mean that nothing is wrong but that it indicates no emergency is currently present that requires admission and they must continue care with follow-up as given below or with a physician of their choice.     FOLLOW-UP  María Pinedo, APRN  [address]  [address]  [place] [id number] [phone]    Call   To advise of your visit and treatment plan here in the emergency department.    Joelle Mcpherson, APRN  [address]  [address]  [place] [id number] [phone]    Schedule an appointment as soon as possible for a visit   As needed         Medication List      New Prescriptions    metoprolol tartrate 25 MG tablet  Commonly known as: LOPRESSOR  Take 1 tablet by mouth every night at bedtime for 15 days.           Where to Get Your Medications      These medications were sent to [facility] [address] [facility] [address] [facility] - [phone]  - [phone] [facility] [address]    Phone: [phone]   · metoprolol tartrate 25 MG tablet                  Linus Echeverria PA-C  12/01/21 [id number]

## 2021-12-02 NOTE — DISCHARGE INSTRUCTIONS
Recheck with your primary care provider this week, and follow-up with your cardiology NP to discuss ongoing metoprolol prescription.  Return to the emergency department immediately if any change or worsening of symptoms.

## 2021-12-03 ENCOUNTER — OFFICE VISIT (OUTPATIENT)
Dept: FAMILY MEDICINE CLINIC | Facility: CLINIC | Age: 36
End: 2021-12-03

## 2021-12-03 ENCOUNTER — TRANSCRIBE ORDERS (OUTPATIENT)
Dept: OCCUPATIONAL THERAPY | Facility: HOSPITAL | Age: 36
End: 2021-12-03

## 2021-12-03 VITALS
HEART RATE: 80 BPM | DIASTOLIC BLOOD PRESSURE: 90 MMHG | RESPIRATION RATE: 18 BRPM | BODY MASS INDEX: 43.4 KG/M2 | OXYGEN SATURATION: 99 % | WEIGHT: 293 LBS | HEIGHT: 69 IN | TEMPERATURE: 97 F | SYSTOLIC BLOOD PRESSURE: 122 MMHG

## 2021-12-03 DIAGNOSIS — D05.12 INTRADUCTAL CARCINOMA IN SITU OF LEFT BREAST: ICD-10-CM

## 2021-12-03 DIAGNOSIS — R00.2 HEART PALPITATIONS: ICD-10-CM

## 2021-12-03 DIAGNOSIS — F41.9 ANXIETY: Primary | ICD-10-CM

## 2021-12-03 DIAGNOSIS — D05.12 DUCTAL CARCINOMA IN SITU OF LEFT BREAST: Primary | ICD-10-CM

## 2021-12-03 LAB
QT INTERVAL: 382 MS
QTC INTERVAL: 474 MS

## 2021-12-03 PROCEDURE — 99213 OFFICE O/P EST LOW 20 MIN: CPT | Performed by: NURSE PRACTITIONER

## 2021-12-03 NOTE — PROGRESS NOTES
"Chief Complaint  Breast Discharge (Follow up Breast Cancer) and Anxiety    Subjective          Regla Holliday presents to [facility]  Patient is a 35 yo female. She is here for for follow up from a regular appointment.   Since her last appointment she had her mammogram and biopsy. She has been diagnosed with \"high grade ductal carcinoma in situ.\" She is having a follow up with [facility] for follow up for Breast cancer. She is currently planning to have surgery for bilateral mastectomy. Patient has appointment at the end of the week to discuss her treatment plan and options. She is seeing 12/09/21 Enzo Arenas and second opinion Dr. Brody Barraza .   She is tearful. She was seen Dec. 1, 2021 in the ER for anxiety, panic attack, heart palpitations. She was given diazepam. She was discharged. Diagnosis was   Panic state as acute reaction to stress.     She is currently seeing María DENT for cardiology. Patient stopped her adderall due to possibility of the heart palpitations. She was started on metoprolol 25 mg qhs. She is hesitant to take the medication. She is upset while here in the office crying. Asked about her support system and her spouse is outside in the car waiting. She became upset talking about her diagnosis and we discussed her medications. She states she has  xanax at home and is to take it as needed.  We discussed her following back with her therapist. She states she stopped seeing her since stopping the Adderall. \"it costs me a lot of money that my insurance doesn't cover to see them\".  She is thinking she may need to go back on her Adderall., Patient upset that this APRN's license is limited by the [employer] in the state [facility] and cannot write for her Adderall. She will need to see a Psychiatric APRN or psychiatrist. She is currently declining referral to  Behavioral health. She states \" I just want to go on home.\" \"you are nice and everything but " "I feel like you are trying to diminish this\". \" I just want to go on home.\"  This provider offered to call and have her spouse come into the office and she declined. Again she stood up saying\" I just want to go on home today\".             The following portions of the patient's history were reviewed and updated as appropriate: allergies, current medications, past family history, past medical history, past social history, past surgical history and problem list.    Review of Systems   Constitutional: Positive for activity change.   HENT: Negative.    Respiratory: Negative.    Cardiovascular: Positive for palpitations.   Gastrointestinal: Negative.    Skin: Negative.    Psychiatric/Behavioral: The patient is nervous/anxious.        Objective   Vital Signs:   /90   Pulse 80   Temp 97 °F (36.1 °C) (Infrared)   Resp 18   Ht 175.3 cm (69\")   Wt (!) 148 kg (326 lb 3.2 oz)   SpO2 99%   BMI 48.17 kg/m²     Physical Exam  Vitals reviewed.   HENT:      Nose: Nose normal.      Mouth/Throat:      Mouth: Mucous membranes are moist.   Eyes:      Pupils: Pupils are equal, round, and reactive to light.   Cardiovascular:      Rate and Rhythm: Normal rate.   Pulmonary:      Effort: Pulmonary effort is normal.   Skin:     General: Skin is warm and dry.      Capillary Refill: Capillary refill takes less than 2 seconds.   Neurological:      Mental Status: She is alert and oriented to person, place, and time.   Psychiatric:         Mood and Affect: Mood is anxious. Affect is tearful.         Cognition and Memory: Cognition normal.         Judgment: Judgment is not impulsive.        Result Review :                 Assessment and Plan    There are no diagnoses linked to this encounter.       Patient declined referral to  Behavioral Health.   She states she has enough xanax and will try to get back in with her therapist.   She planning to keep her follow up with oncology.   She has already seen the Genetic counselor. Waiting on " results.          Follow Up   Return in about 1 week (around 12/10/2021).  Patient was given instructions and counseling regarding her condition or for health maintenance advice. Please see specific information pulled into the AVS if appropriate.

## 2021-12-03 NOTE — PROGRESS NOTES
Regla Holliday, a 36-year-old female, was seen for genetic counseling due to a personal history of breast cancer.  Ms. 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 She is in the process of making a surgical decision. Ms. Holliday retains her uterus and ovaries. She was interested in discussing her risk for a hereditary cancer syndrome.  Ms. Holliday was interested in pursuing a multi-gene panel to evaluate her risk of cancer, therefore the CancerNext panel was ordered through [employer] which analyzes BRCA1/2 and 34 additional genes associated with an increased cancer risk. Results from the high/moderate risk breast cancer genes are expected in 10 days, and results from the remainder of the panel are expected in 2-3 weeks.    PERTINENT FAMILY HISTORY: (See attached pedigree)   Pat. Aunt:  Breast cancer, 50s  Pat. Aunt:  Breast cancer, 40s  Pat. Grandmother: Acute leukemia, 60s     Colon cancer, 70s  Pat. Great Aunt: Breast cancer, 60s     Ovarian cancer, 60s    We do not have medical records regarding any of these diagnoses.      RISK ASSESSMENT:  Ms. Holliday’s personal and family history of cancer led to concern for a hereditary cancer syndrome. We discussed BRCA1/2 testing as well as the option of pursuing a panel that would test for other genes known to impact cancer risk in addition to BRCA1/2.   Ms. Holliday clearly meets NCCN guidelines criteria for BRCA1/2 testing based on her personal history of breast cancer diagnosed before age 45, and family history of breast cancer. These risk assessments are based on the family history information provided at the time of the appointment, and could change in the future should new information be obtained.    GENETIC COUNSELING: (30 minutes) We reviewed the family history information in detail. Cases of cancer follow three general patterns: sporadic, familial, and hereditary.  While most cancer is sporadic, some cases appear to occur in family  clusters.  These cases are said to be familial and account for 10-20% of cancer cases.  Familial cases may be due to a combination of shared genes and environmental factors among family members.  In even fewer families (5-10%), the cancer is said to be inherited, and the genes responsible for the cancer are known.      Family histories typical of hereditary cancer syndromes usually include multiple first- and second-degree relatives diagnosed with cancer types that define a syndrome.  These cases tend to be diagnosed at younger-than-expected ages and can be bilateral or multifocal.  The cancer in these families follows an autosomal dominant inheritance pattern, which indicates the likely presence of a mutation in a cancer susceptibility gene.  Children and siblings of an individual believed to carry this mutation have a 50% chance of inheriting that mutation, thereby inheriting the increased risk to develop cancer.  These mutations can be passed down from the maternal or the paternal lineage.    Hereditary breast cancer accounts for 5-10% of all cases of breast cancer.  A significant proportion of hereditary breast and ovarian cancer can be attributed to mutations in the BRCA1 and BRCA2 genes.  Mutations in these genes confer an increased risk for breast cancer, ovarian cancer, male breast cancer, prostate cancer, and pancreatic cancer. Women with a BRCA1 or BRCA2 mutation who have already been diagnosed with breast cancer have a 40-60% lifetime risk of a second breast cancer. Women with a BRCA1 or BRCA2 mutation have up to a 44% risk of ovarian cancer.      There are other genes that are known to be associated with an increased risk for cancer.  Some of these genes have well defined cancer risks and established management guidelines.  Other genes that can be tested for have been more recently described, and there may be less data regarding the risks and therefore may not have established management guidelines. We  discussed these limitations at length.  Based on Ms. Holliday’s desire to get as much information as possible regarding her personal risks and potential risks for her family, she opted to pursue testing through a panel that would look at several other genes known to increase the risk for cancer.    GENETIC TESTING:  The risks, benefits and limitations of genetic testing and implications for clinical management following testing were reviewed.  DNA test results can influence decisions regarding screening, prevention and surgical management.  Genetic testing can have significant psychological implications for both individuals and families.  Also discussed was the possibility of employment and insurance discrimination based on genetic test results and the laws in place to prevent this ([place]).    We discussed panel testing, which would involve testing for BRCA1/2 as well as 34 additional genes that are associated with increased cancer risk. The benefits and limitations of genetic testing were discussed and Ms. Holliday decided to pursue testing via the panel. The implications of a positive or negative test result were discussed. We discussed the possibility that, in some cases, genetic test results may be informative or may be ambiguous due to the identification of a genetic variant. These variants may or may not be associated with an increased cancer risk.  With multigene panel testing, it is not uncommon for a variant of uncertain significance (VUS) to be identified.  If a VUS is identified, testing family members is typically not recommended and screening recommendations are made based on the family history.  The laboratories that perform genetic testing work to reclassify the VUS and send out an amended report if and when a VUS is reclassified.  The majority of variant findings are ultimately reclassified to a negative result.  Given her personal and family history, a negative test result would not eliminate all  cancer risk to her relatives, although the risk would not be as high as it would with positive genetic testing.      PLAN: Genetic testing via the CancerNext panel through [employer] was ordered and results are expected within 2-3 weeks. Ms. Holliday is welcome to contact us in the meantime with any questions she may have.      Vianney Webb MS, [facility], [facility]  Licensed Certified Genetic Counselor

## 2021-12-07 ENCOUNTER — TELEPHONE (OUTPATIENT)
Dept: GENETICS | Facility: HOSPITAL | Age: 36
End: 2021-12-07

## 2021-12-07 NOTE — TELEPHONE ENCOUNTER
I called the patient to discuss her genetic test results.  The high/moderate breast cancer risk portion of the panel was resulted out first in order to expedite surgical decision making, and testing was negative for mutations in BRCA1/2, DARBY, CHEK2, CDH1, PALB2, PTEN, and TP53.  The remainder of the CancerNext panel, including lower risk genes, non-breast cancer related genes, and RNA analysis is still pending, and the patient will be contacted once those results are available. Full summary note, pedigree, and results will be sent to patient, referring provider, and treating physicians once full panel results are back.     Vianney Webb MS, [facility] [place], [facility]  Licensed Certified Genetic Counselor    Cc: MD Lamar Bermudez MD

## 2021-12-08 ENCOUNTER — OFFICE VISIT (OUTPATIENT)
Dept: CARDIOLOGY | Facility: HOSPITAL | Age: 36
End: 2021-12-08

## 2021-12-08 VITALS
OXYGEN SATURATION: 97 % | SYSTOLIC BLOOD PRESSURE: 135 MMHG | BODY MASS INDEX: 43.4 KG/M2 | RESPIRATION RATE: 18 BRPM | DIASTOLIC BLOOD PRESSURE: 74 MMHG | WEIGHT: 293 LBS | HEIGHT: 69 IN | TEMPERATURE: 96.5 F | HEART RATE: 87 BPM

## 2021-12-08 DIAGNOSIS — J45.20 INTERMITTENT ASTHMA, UNSPECIFIED ASTHMA SEVERITY, UNSPECIFIED WHETHER COMPLICATED: ICD-10-CM

## 2021-12-08 DIAGNOSIS — I49.3 FREQUENT PVCS: Primary | ICD-10-CM

## 2021-12-08 PROCEDURE — 99214 OFFICE O/P EST MOD 30 MIN: CPT | Performed by: NURSE PRACTITIONER

## 2021-12-08 RX ORDER — METOPROLOL SUCCINATE 25 MG/1
25 TABLET, EXTENDED RELEASE ORAL DAILY
Qty: 90 TABLET | Refills: 1 | Status: SHIPPED | OUTPATIENT
Start: 2021-12-08 | End: 2022-03-09 | Stop reason: SDUPTHER

## 2021-12-08 RX ORDER — ESCITALOPRAM OXALATE 10 MG/1
10 TABLET ORAL DAILY
COMMUNITY
Start: 2021-12-03 | End: 2022-12-12

## 2021-12-08 RX ORDER — BUDESONIDE AND FORMOTEROL FUMARATE DIHYDRATE 80; 4.5 UG/1; UG/1
2 AEROSOL RESPIRATORY (INHALATION)
Qty: 1 EACH | Refills: 12 | Status: SHIPPED | OUTPATIENT
Start: 2021-12-08

## 2021-12-08 NOTE — PROGRESS NOTES
"Chief Complaint  Palpitations and Follow-up    Subjective    History of Present Illness {CC  Problem List  Visit  Diagnosis   Encounters  Notes  Medications  Labs  Result Review Imaging  Media :23}     Regla Holliday presents to [facility] for   ER follow up from 12/1/21.  Hx of abnormal extended holter notable for PVC burden 15% from March 2021.  Follow up here in [facility] 10/20.  Rx for metoprolol xl 25 mg QPM but she was reluctant to start meds.      Recent diagnosis breast cancer.  Formulating treatment plan with [facility].  Has recently met with genetic counselor.      Symptoms @ ER visit included panic feeling, anxiety, and near syncope.   EKG in ER SR with short MO interval but no PVC's.  Patient also notes she wishes to change PCP to another [facility] office.  She also states since she has been diagnosed w/ breast cancer she has been feeling \"wheezy\" more days than not.  Hx of asthma and allergy shots/ allergy testing.  Previously on Symbicort but it was very cost prohibitive.  When on steroid inhaler, hardly used rescue inhaler at all.  Currently using albuterol nearly daily.       Objective     Vital Signs:   Vitals:    12/08/21 1003   BP: 135/74   BP Location: Right arm   Patient Position: Sitting   Cuff Size: Large Adult   Pulse: 87   Resp: 18   Temp: 96.5 °F (35.8 °C)   TempSrc: Temporal   SpO2: 97%   Weight: (!) 145 kg (320 lb 4 oz)   Height: 175.3 cm (69\")     Body mass index is 47.29 kg/m².  Physical Exam  Vitals reviewed.   Constitutional:       General: She is not in acute distress.     Appearance: She is obese.   HENT:      Head: Normocephalic and atraumatic.   Eyes:      Pupils: Pupils are equal, round, and reactive to light.   Cardiovascular:      Rate and Rhythm: Normal rate and regular rhythm.      Pulses: Normal pulses.      Heart sounds: Normal heart sounds.   Pulmonary:      Effort: Pulmonary effort is normal.      Breath sounds: Normal breath " sounds.   Musculoskeletal:         General: Normal range of motion.      Right lower leg: No edema.      Left lower leg: No edema.   Skin:     General: Skin is warm and dry.   Neurological:      General: No focal deficit present.      Mental Status: She is alert and oriented to person, place, and time.   Psychiatric:         Mood and Affect: Mood normal.         Behavior: Behavior normal.        Result Review  Data Reviewed:{ Labs  Result Review  Imaging  Med Tab  Media :23}     Cardiology studies :   ECG 12 Lead (12/01/2021 19:32)  Adult Transthoracic Echo Complete w/ Color, Spectral and Contrast if necessary per protocol (10/19/2021 17:04)  Holter Monitor - 72 Hour Up To 15 Days (01/28/2021 16:26)           Assessment and Plan {CC Problem List  Visit Diagnosis  ROS  Review (Popup)  Health Maintenance  Quality  BestPractice  Medications  SmartSets  SnapShot Encounters  Media :23}   1. Frequent PVCs  - Improved w/ metoprolol  - currently taking tartrate 25 mg daily/ changed to succinate 25 mg QPM  - follow up in three months  - recommend repeat echo in one year    2. Intermittent asthma, unspecified asthma severity, unspecified whether complicated  - Rx for generic symbicort  - Ambulatory Referral to Pulmonology    3. Panic attach/ ADHD  - ambulatory referral to family medicine in [facility] to manage treatment meds      39 minutes    Follow Up {Instructions Charge Capture  Follow-up Communications :23}   Return in about 3 months (around 3/8/2022).    Patient was given instructions and counseling regarding her condition or for health maintenance advice. Please see specific information pulled into the AVS if appropriate.  Patient was instructed to call the [facility] with any questions, concerns, or worsening symptoms.    *Please note that portions of this note were completed with a voice recognition program. Efforts were made to edit the dictations, but occasionally words  are mistranscribed.

## 2021-12-09 ENCOUNTER — LAB REQUISITION (OUTPATIENT)
Dept: LAB | Facility: HOSPITAL | Age: 36
End: 2021-12-09

## 2021-12-09 DIAGNOSIS — R92.8 OTHER ABNORMAL AND INCONCLUSIVE FINDINGS ON DIAGNOSTIC IMAGING OF BREAST: ICD-10-CM

## 2021-12-09 DIAGNOSIS — D05.12 INTRADUCTAL CARCINOMA IN SITU OF LEFT BREAST: ICD-10-CM

## 2021-12-09 LAB
CYTO UR: NORMAL
LAB AP CASE REPORT: NORMAL
LAB AP CLINICAL INFORMATION: NORMAL
LAB AP DIAGNOSIS COMMENT: NORMAL
PATH REPORT.FINAL DX SPEC: NORMAL
PATH REPORT.GROSS SPEC: NORMAL

## 2021-12-14 PROBLEM — D05.12 INTRADUCTAL CARCINOMA IN SITU OF LEFT BREAST: Status: ACTIVE | Noted: 2021-12-14

## 2021-12-15 ENCOUNTER — APPOINTMENT (OUTPATIENT)
Dept: MAMMOGRAPHY | Facility: HOSPITAL | Age: 36
End: 2021-12-15

## 2021-12-17 NOTE — PATIENT INSTRUCTIONS
Ductal Carcinoma In Situ    Ductal carcinoma in situ is the presence of abnormal cells in the breast. It is the earliest form of breast cancer. The abnormal cells are located only in the tubes that carry milk to the nipple (milk ducts) and have not spread to other areas.  What are the causes?  The exact cause of ductal carcinoma in situ is not known.  What increases the risk?  The following factors increase the risk of developing ductal carcinoma in situ:  · Being older than 55 years of age.  · Being female.  · Having a family history of breast cancer.  · Current or past hormone use, such as:  ? Using birth control.  ? Taking hormone therapy after menopause.  · Starting menopause after age 55.  · A personal history of:  ? Breast cancer.  ? Dense breasts.  ? Radiation treatments to the breasts or chest area.  ? Having the BRCA1 and BRCA2 genes.  · Drinking more than 1 alcoholic beverage a day.  · Starting your menstrual periods before age 12.  · Having never been pregnant or having your first child after age 30.  · Having never .  · Having an inactive (sedentary) lifestyle.  · Exposure to the drug AIDEN, which was given to pregnant women from the 1940s to the 1970s.  What are the signs or symptoms?  Ductal carcinoma in situ does not cause any symptoms.  How is this diagnosed?  Ductal carcinoma in situ is usually discovered during a routine X-ray of the breasts to check for abnormal changes (mammogram). To diagnose the condition, your health care provider may do an ultrasound and remove a tissue sample from your breast so it can be examined under a microscope (breast biopsy).  Your health care provider may also remove one or more lymph nodes from under your arm to check if the abnormal cells have spread to your lymph nodes (sentinel lymph node biopsy). Lymph nodes are part of the body's disease-fighting (immune) system. They are located throughout the body. The lymph nodes under the arms are usually the first  place where abnormal cells spread.  How is this treated?  Ductal carcinoma in situ treatment may include:  · A lumpectomy. This is surgery to remove the area of abnormal cells, along with a ring of normal tissue. This may also be called breast-conserving surgery.  · Simple mastectomy. This is surgery to remove breast tissue, the nipple, and the [place] of colored tissue around the nipple (areola). Sometimes, one or more lymph nodes from under the arm are also removed and tested for cancer cells.  · Preventive mastectomy. This is the removal of both breasts. This is usually done only if you have a very high risk of developing breast cancer.  · Radiation. This is the use of high-energy rays to kill cancer cells.  · Medicines (hormone therapy) to keep the abnormal cells from spreading.  Follow these instructions at home:  · Take over-the-counter and prescription medicines only as told by your health care provider.  · Eat a healthy diet. A healthy diet includes lots of fruits and vegetables, low-fat dairy products, lean meats, and fiber.  ? Make sure half your plate is filled with fruits or vegetables.  ? Choose high-fiber foods such as whole-grain breads and cereals.  · Limit alcohol intake to no more than 1 drink a day for women (no drinks if you are pregnant) and 2 drinks a day for men. One drink equals 12 oz of beer, 5 oz of wine, or 1½ oz of hard liquor.  · Do not use any products that contain nicotine or tobacco, such as cigarettes and e-cigarettes. If you need help quitting, ask your health care provider.  · Keep all follow-up visits as told by your health care provider. This is important.  Where to find more information  · American Cancer Society: www.cancer.org  · National Cancer [place]: www.cancer.gov  Contact a health care provider if:  · You have a fever.  · You notice a new lump in either breast or under your arm.  · You have any symptoms or changes that concern you.  Get help right away if:  · You have  "chest pain or trouble breathing.  Summary  · Ductal carcinoma in situ is the presence of abnormal cells in the breast. It is the earliest form of breast cancer.  · The exact cause of ductal carcinoma in situ is not known. The risk increases with age and with current or past hormone use.  · To diagnose the condition, your health care provider will remove a tissue sample from your breast so it can be examined under a microscope (breast biopsy).  This information is not intended to replace advice given to you by your health care provider. Make sure you discuss any questions you have with your health care provider.  Document Revised: 11/30/2018 Document Reviewed: 09/25/2018  [place] Patient Education © 2021 [employer].    https://www.[facility].nih.gov/health/topics/anxiety-disorders/index.shtml\">   Panic Attack  A panic attack is when you suddenly feel very afraid, uncomfortable, or nervous (anxious). A panic attack can happen when you are scared or for no reason.  A panic attack can feel like a serious problem. It can even feel like a heart attack or stroke. See your doctor when you have a panic attack to make sure you do not have a serious problem.  Follow these instructions at home:    · Take medicines only as told by your doctor.  · If you feel worried or nervous, try not to have caffeine.  · Take good care of your health. To do this:  ? Eat healthy. Make sure to eat fresh fruits and vegetables, whole grains, lean meats, and low-fat dairy.  ? Get enough sleep. Try to sleep for 7-8 hours each night.  ? Exercise. Try to be active for 30 minutes 5 or more days a week.  ? Do not smoke. Talk to your doctor if you need help quitting.  ? Limit how much alcohol you drink:  § If you are a woman who is not pregnant: try not to have more than 1 drink a day.  § If you are a man: try not to have more than 2 drinks a day.  § One drink equals 12 oz of beer, 5 oz of wine, or 1½ oz of hard liquor.  · Keep all follow-up visits as told " "by your doctor. This is important.  Contact a doctor if:  · Your symptoms do not get better.  · Your symptoms get worse.  · You are not able to take your medicines as told.  Get help right away if:  · You have thoughts of hurting yourself or others.  · You have symptoms of a panic attack. Do not drive yourself to the hospital. Have someone else drive you or call an ambulance.  If you feel like you may hurt yourself or others, or have thoughts about taking your own life, get help right away. You can go to your nearest emergency department or call:  · Your local emergency services (911 in the [place]  · A suicide crisis helpline, such as the National Suicide Prevention Lifeline at 1-728.952.9242. This is open 24 hours a day.  Summary  · A panic attack is when you suddenly feel very afraid, uncomfortable, or nervous (anxious).  · See your doctor when you have a panic attack to make sure that you do not have another serious problem.  · If you feel like you may hurt yourself or others, get help right away by calling 911.  This information is not intended to replace advice given to you by your health care provider. Make sure you discuss any questions you have with your health care provider.  Document Revised: 06/17/2021 Document Reviewed: 06/17/2021  [employer] Patient Education © 2021 [employer]    http://[facility].NIH.Gov\">   Generalized Anxiety Disorder, Adult  Generalized anxiety disorder (JENNIFER) is a mental health condition. Unlike normal worries, anxiety related to JENNIFER is not triggered by a specific event. These worries do not fade or get better with time. JENNIFER interferes with relationships, work, and school.  JENNIFER symptoms can vary from mild to severe. People with severe JENNIFER can have intense waves of anxiety with physical symptoms that are similar to panic attacks.  What are the causes?  The exact cause of JENNIFER is not known, but the following are believed to have an impact:  · Differences in natural brain chemicals.  · Genes " passed down from parents to children.  · Differences in the way threats are perceived.  · Development during childhood.  · Personality.  What increases the risk?  The following factors may make you more likely to develop this condition:  · Being female.  · Having a family history of anxiety disorders.  · Being very shy.  · Experiencing very stressful life events, such as the death of a loved one.  · Having a very stressful family environment.  What are the signs or symptoms?  People with JENNIFER often worry excessively about many things in their lives, such as their health and family. Symptoms may also include:  · Mental and emotional symptoms:  ? Worrying excessively about natural disasters.  ? Fear of being late.  ? Difficulty concentrating.  ? Fears that others are judging your performance.  · Physical symptoms:  ? Fatigue.  ? Headaches, muscle tension, muscle twitches, trembling, or feeling shaky.  ? Feeling like your heart is pounding or beating very fast.  ? Feeling out of breath or like you cannot take a deep breath.  ? Having trouble falling asleep or staying asleep, or experiencing restlessness.  ? Sweating.  ? Nausea, diarrhea, or irritable bowel syndrome (IBS).  · Behavioral symptoms:  ? Experiencing erratic moods or irritability.  ? Avoidance of new situations.  ? Avoidance of people.  ? Extreme difficulty making decisions.  How is this diagnosed?  This condition is diagnosed based on your symptoms and medical history. You will also have a physical exam. Your health care provider may perform tests to rule out other possible causes of your symptoms.  To be diagnosed with JENNIFER, a person must have anxiety that:  · Is out of his or her control.  · Affects several different aspects of his or her life, such as work and relationships.  · Causes distress that makes him or her unable to take part in normal activities.  · Includes at least three symptoms of JENNIFER, such as restlessness, fatigue, trouble concentrating,  irritability, muscle tension, or sleep problems.  Before your health care provider can confirm a diagnosis of JENNIFER, these symptoms must be present more days than they are not, and they must last for 6 months or longer.  How is this treated?  This condition may be treated with:  · Medicine. Antidepressant medicine is usually prescribed for long-term daily control. Anti-anxiety medicines may be added in severe cases, especially when panic attacks occur.  · Talk therapy (psychotherapy). Certain types of talk therapy can be helpful in treating JENNIFER by providing support, education, and guidance. Options include:  ? Cognitive behavioral therapy (CBT). People learn coping skills and self-calming techniques to ease their physical symptoms. They learn to identify unrealistic thoughts and behaviors and to replace them with more appropriate thoughts and behaviors.  ? Acceptance and commitment therapy (ACT). This treatment teaches people how to be mindful as a way to cope with unwanted thoughts and feelings.  ? Biofeedback. This process trains you to manage your body's response (physiological response) through breathing techniques and relaxation methods. You will work with a therapist while machines are used to monitor your physical symptoms.  · Stress management techniques. These include yoga, meditation, and exercise.  A mental health specialist can help determine which treatment is best for you. Some people see improvement with one type of therapy. However, other people require a combination of therapies.  Follow these instructions at home:  Lifestyle  · Maintain a consistent routine and schedule.  · Anticipate stressful situations. Create a plan, and allow extra time to work with your plan.  · Practice stress management or self-calming techniques that you have learned from your therapist or your health care provider.  General instructions  · Take over-the-counter and prescription medicines only as told by your health care  provider.  · Understand that you are likely to have setbacks. Accept this and be kind to yourself as you persist to take better care of yourself.  · Recognize and accept your accomplishments, even if you  them as small.  · Keep all follow-up visits as told by your health care provider. This is important.  Contact a health care provider if:  · Your symptoms do not get better.  · Your symptoms get worse.  · You have signs of depression, such as:  ? A persistently sad or irritable mood.  ? Loss of enjoyment in activities that used to bring you paz.  ? Change in weight or eating.  ? Changes in sleeping habits.  ? Avoiding friends or family members.  ? Loss of energy for normal tasks.  ? Feelings of guilt or worthlessness.  Get help right away if:  · You have serious thoughts about hurting yourself or others.  If you ever feel like you may hurt yourself or others, or have thoughts about taking your own life, get help right away. Go to your nearest emergency department or:  · Call your local emergency services (911 in the [place]  · Call a suicide crisis helpline, such as the National Suicide Prevention Lifeline at 1-425.329.2477. This is open 24 hours a day in the [place]  · Text the Crisis Text Line at 892909 (in the [place]  Summary  · Generalized anxiety disorder (JENNIFER) is a mental health condition that involves worry that is not triggered by a specific event.  · People with JENNIFER often worry excessively about many things in their lives, such as their health and family.  · JENNIFER may cause symptoms such as restlessness, trouble concentrating, sleep problems, frequent sweating, nausea, diarrhea, headaches, and trembling or muscle twitching.  · A mental health specialist can help determine which treatment is best for you. Some people see improvement with one type of therapy. However, other people require a combination of therapies.  This information is not intended to replace advice given to you by your health care provider.  Make sure you discuss any questions you have with your health care provider.  Document Revised: 10/07/2020 Document Reviewed: 10/07/2020  [employer] Patient Education © 2021 [employer]

## 2021-12-21 ENCOUNTER — DOCUMENTATION (OUTPATIENT)
Dept: GENETICS | Facility: HOSPITAL | Age: 36
End: 2021-12-21

## 2021-12-21 NOTE — PROGRESS NOTES
Regla Holliday, a 36-year-old female, was seen for genetic counseling due to a personal history of breast cancer.  Ms. 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 She is in the process of making a surgical decision. Ms. Holliday retains her uterus and ovaries. She was interested in discussing her risk for a hereditary cancer syndrome.  Ms. Holliday was interested in pursuing a multi-gene panel to evaluate her risk of cancer, therefore the CancerNext panel was ordered through [employer] which analyzes BRCA1/2 and 34 additional genes associated with an increased cancer risk. The genes on this panel include APC, DARBY, AXIN2, BARD1, BMPR1A, BRCA1, BRCA2, BRIP1, CDH1, CDK4, CDKN2A, CHEK2, DICER1, EPCAM, GREM1, HOXB13, MLH1, MSH2, MSH3, MSH6, MUTYH, NBN, NF1, NTHL1, PALB2, PMS2, POLD1, POLE, PTEN, RAD51C, RAD51D, RECQL, SMAD4, SMARCA4, STK11, and TP53.   Genetic testing was negative for known pathogenic (harmful) mutations in BRCA1/2 and 34 additional genes on the CancerNext panel. While no known pathogenic mutations were identified, a variant of uncertain significance was identified in the MUTYH gene. Variants are changes in DNA that may or may not affect the function of the gene.  The classification of a variant to either a benign gene change or pathogenic mutation depends on a number of factors.  The majority (estimated to be >90%) of VUS’s are eventually reclassified as benign gene changes. It is not recommended that any unaffected relatives be tested for this variant at this time. This result is not considered clinically actionable at this time.  These results were discussed with Ms. Holliday by telephone on 12/21/2021.    PERTINENT FAMILY HISTORY: (See attached pedigree)   Pat. Aunt:  Breast cancer, 50s  Pat. Aunt:  Breast cancer, 40s  Pat. Grandmother: Acute leukemia, 60s     Colon cancer, 70s  Pat. Great Aunt: Breast cancer, 60s     Ovarian cancer, 60s    We do not have medical  records regarding any of these diagnoses.      RISK ASSESSMENT:  Ms. Holliday’s personal and family history of cancer led to concern for a hereditary cancer syndrome. We discussed BRCA1/2 testing as well as the option of pursuing a panel that would test for other genes known to impact cancer risk in addition to BRCA1/2.   Ms. Holliday clearly meets NCCN guidelines criteria for BRCA1/2 testing based on her personal history of breast cancer diagnosed before age 45, and family history of breast cancer. These risk assessments are based on the family history information provided at the time of the appointment, and could change in the future should new information be obtained.    GENETIC COUNSELING: We reviewed the family history information in detail. Cases of cancer follow three general patterns: sporadic, familial, and hereditary.  While most cancer is sporadic, some cases appear to occur in family clusters.  These cases are said to be familial and account for 10-20% of cancer cases.  Familial cases may be due to a combination of shared genes and environmental factors among family members.  In even fewer families (5-10%), the cancer is said to be inherited, and the genes responsible for the cancer are known.      Family histories typical of hereditary cancer syndromes usually include multiple first- and second-degree relatives diagnosed with cancer types that define a syndrome.  These cases tend to be diagnosed at younger-than-expected ages and can be bilateral or multifocal.  The cancer in these families follows an autosomal dominant inheritance pattern, which indicates the likely presence of a mutation in a cancer susceptibility gene.  Children and siblings of an individual believed to carry this mutation have a 50% chance of inheriting that mutation, thereby inheriting the increased risk to develop cancer.  These mutations can be passed down from the maternal or the paternal lineage.    Hereditary breast cancer  accounts for 5-10% of all cases of breast cancer.  A significant proportion of hereditary breast and ovarian cancer can be attributed to mutations in the BRCA1 and BRCA2 genes.  Mutations in these genes confer an increased risk for breast cancer, ovarian cancer, male breast cancer, prostate cancer, and pancreatic cancer. Women with a BRCA1 or BRCA2 mutation who have already been diagnosed with breast cancer have a 40-60% lifetime risk of a second breast cancer. Women with a BRCA1 or BRCA2 mutation have up to a 44% risk of ovarian cancer.      There are other genes that are known to be associated with an increased risk for cancer.  Some of these genes have well defined cancer risks and established management guidelines.  Other genes that can be tested for have been more recently described, and there may be less data regarding the risks and therefore may not have established management guidelines. We discussed these limitations at length.  Based on Ms. Holliday’s desire to get as much information as possible regarding her personal risks and potential risks for her family, she opted to pursue testing through a panel that would look at several other genes known to increase the risk for cancer.    GENETIC TESTING:  The risks, benefits and limitations of genetic testing and implications for clinical management following testing were reviewed.  DNA test results can influence decisions regarding screening, prevention and surgical management.  Genetic testing can have significant psychological implications for both individuals and families.  Also discussed was the possibility of employment and insurance discrimination based on genetic test results and the laws in place to prevent this ([place]).    We discussed panel testing, which would involve testing for BRCA1/2 as well as 34 additional genes that are associated with increased cancer risk. The benefits and limitations of genetic testing were discussed and Ms. Holliday decided  to pursue testing via the panel. The implications of a positive or negative test result were discussed. We discussed the possibility that, in some cases, genetic test results may be informative or may be ambiguous due to the identification of a genetic variant. These variants may or may not be associated with an increased cancer risk.  With multigene panel testing, it is not uncommon for a variant of uncertain significance (VUS) to be identified.  If a VUS is identified, testing family members is typically not recommended and screening recommendations are made based on the family history.  The laboratories that perform genetic testing work to reclassify the VUS and send out an amended report if and when a VUS is reclassified.  The majority of variant findings are ultimately reclassified to a negative result.  Given her personal and family history, a negative test result would not eliminate all cancer risk to her relatives, although the risk would not be as high as it would with positive genetic testing.      TEST RESULTS:  Genetic testing was negative for known pathogenic mutations by sequencing, rearrangement testing, and RNA analysis for the genes on the CancerNext panel.    A variant of uncertain significance was identified in the MUTYH gene.  A VUS is a finding that may or may not impact the function of the gene.  VUSs are not clinically actionable findings, and therefore this result does not impact management at this time.  The majority of VUSs are ultimately reclassified to benign changes.  The identification of a VUS is common in multigene panel testing, given the number of genes being evaluated.  If this VUS is ever reclassified, a new report will be issued by the laboratory and released directly to the ordering physician, and our office.  This assessment is based on the information provided at the time of the consultation.    CLINICAL MANAGEMENT GUIDELINES: ’s surveillance and management should be  determined by her oncology team. Despite the genetic test results that were negative for known pathogenic mutations, Ms. Holliday’s female relatives may have a somewhat increased lifetime risk for breast cancer based on family history.  Surveillance for individuals with a high lifetime risk of breast cancer (>20%), based on NCCN guidelines, would consist of semi-annual clinical breast exams and monthly self-breast exams starting by age 18 and annual mammography starting 10 years younger than the earliest diagnosis in the family, or age 40, whichever is earliest.  According to an American Cancer Society expert panel, annual breast MRI should be offered to women whose lifetime risk of breast cancer is 20-25 percent or more, typically beginning at the same age as mammography.  Relatives may have a personalized risk assessment performed to quantify their breast cancer risk using a family history based risk model, such as the Tyrer-Cuzick model.     PLAN: Genetic counseling remains available to Ms. Holliday. We encourage her to contact our office with any questions she may have at [phone].        Vianney Webb MS, [facility] [place], [facility]  Licensed Certified Genetic Counselor       Cc: MD Lamar Ford MD Mincha Parker, APRN

## 2022-03-09 ENCOUNTER — OFFICE VISIT (OUTPATIENT)
Dept: CARDIOLOGY | Facility: HOSPITAL | Age: 37
End: 2022-03-09

## 2022-03-09 VITALS
WEIGHT: 293 LBS | SYSTOLIC BLOOD PRESSURE: 123 MMHG | DIASTOLIC BLOOD PRESSURE: 67 MMHG | OXYGEN SATURATION: 99 % | BODY MASS INDEX: 43.4 KG/M2 | RESPIRATION RATE: 13 BRPM | HEIGHT: 69 IN | TEMPERATURE: 97.3 F | HEART RATE: 64 BPM

## 2022-03-09 DIAGNOSIS — G47.20 DISTURBED SLEEP RHYTHM: ICD-10-CM

## 2022-03-09 DIAGNOSIS — I49.3 FREQUENT PVCS: Primary | ICD-10-CM

## 2022-03-09 DIAGNOSIS — J45.20 INTERMITTENT ASTHMA, UNSPECIFIED ASTHMA SEVERITY, UNSPECIFIED WHETHER COMPLICATED: ICD-10-CM

## 2022-03-09 DIAGNOSIS — R00.2 PALPITATIONS: ICD-10-CM

## 2022-03-09 PROCEDURE — 99214 OFFICE O/P EST MOD 30 MIN: CPT | Performed by: NURSE PRACTITIONER

## 2022-03-09 RX ORDER — METOPROLOL SUCCINATE 25 MG/1
37.5 TABLET, EXTENDED RELEASE ORAL DAILY
Qty: 90 TABLET | Refills: 1
Start: 2022-03-09 | End: 2022-05-19 | Stop reason: SDUPTHER

## 2022-03-09 RX ORDER — BUDESONIDE AND FORMOTEROL FUMARATE DIHYDRATE 80; 4.5 UG/1; UG/1
AEROSOL RESPIRATORY (INHALATION)
COMMUNITY
Start: 2021-12-08 | End: 2022-03-09 | Stop reason: SDUPTHER

## 2022-03-09 RX ORDER — CLONAZEPAM 0.5 MG/1
0.5 TABLET, ORALLY DISINTEGRATING ORAL
COMMUNITY
End: 2022-12-12

## 2022-03-09 RX ORDER — MULTIVITAMIN WITH IRON
1 TABLET ORAL DAILY
COMMUNITY

## 2022-03-09 NOTE — PROGRESS NOTES
"Chief Complaint  Palpitations and Follow-up    Subjective    History of Present Illness {CC  Problem List  Visit  Diagnosis   Encounters  Notes  Medications  Labs  Result Review Imaging  Media :23}     Regla Holliday, 36 y.o. female with PVCs, breast CA s/p left/right mastectomy at  presents to [facility] Heart and Valve clinic for Palpitations and Follow-up.    Patient recently completed emergency department follow-up with heart valve clinic on 12/8/2021.  Patient was evaluated in [facility] emergency department on 12/1/2021 for complaints of a panic attack with associated PVCs and dyspnea.  During previous heart valve clinic visit patient noted PVC symptoms improved with metoprolol and her metoprolol tartrate 25 mg daily was changed to metoprolol succinate 25 mg nightly.  She was instructed to follow-up in heart valve clinic in approximately 3 months, and repeat echocardiogram in 1 year.  Patient was also referred to family medicine after requesting to change primary care providers.    Patient presents today feeling overall well from a cardiovascular standpoint, stable palpitation symptoms continue.  She notes continued intermittent palpitation symptoms described as a occasional skipped heartbeat; aware of palpitations, but not very bothersome.  She notes occasional elevated heart rate when anxious, but no racing heart episodes apart from increased anxiety episodes.  She denies dyspnea and syncope. Resting heart rate average 70s.  She denies chest pain, and dyspnea.      Objective     Vital Signs:   Vitals:    03/09/22 0903   BP: 123/67   BP Location: Left arm   Patient Position: Sitting   Cuff Size: Adult   Pulse: 64   Resp: 13   Temp: 97.3 °F (36.3 °C)   TempSrc: Temporal   SpO2: 99%   Weight: (!) 147 kg (325 lb)   Height: 175.3 cm (69\")     Body mass index is 47.99 kg/m².  Physical Exam  Vitals and nursing note reviewed.   Constitutional:       Appearance: Normal appearance. She is obese. "   HENT:      Head: Normocephalic.   Eyes:      Extraocular Movements: Extraocular movements intact.   Neck:      Vascular: No carotid bruit.   Cardiovascular:      Rate and Rhythm: Normal rate and regular rhythm.      Pulses: Normal pulses.      Heart sounds: Normal heart sounds, S1 normal and S2 normal. No murmur heard.  Pulmonary:      Effort: Pulmonary effort is normal. No respiratory distress.      Breath sounds: Normal breath sounds.   Musculoskeletal:      Cervical back: Neck supple.      Right lower leg: No edema.      Left lower leg: No edema.   Skin:     General: Skin is warm and dry.   Neurological:      General: No focal deficit present.      Mental Status: She is alert.   Psychiatric:         Mood and Affect: Mood normal.         Behavior: Behavior normal.         Thought Content: Thought content normal.        Data Reviewed:{ Labs  Result Review  Imaging  Med Tab  Media :23}     ECG 12 Lead (12/01/2021 19:32)  Adult Transthoracic Echo Complete w/ Color, Spectral and Contrast if necessary per protocol (10/19/2021 17:04)  Holter Monitor - 72 Hour Up To 15 Days (01/28/2021 16:26)  ECG 12 Lead (12/01/2021 19:32)  CBC (01/12/2022 07:38)  Comprehensive metabolic panel (01/12/2022 07:38)  T4, Free (12/01/2021 19:38)  TSH (12/01/2021 19:38)  Troponin (12/01/2021 19:38)  BNP (12/01/2021 19:38)      Assessment and Plan {CC Problem List  Visit Diagnosis  ROS  Review (Popup)  Health Maintenance  Quality  BestPractice  Medications  SmartSets  SnapShot Encounters  Media :23}     1. Frequent PVCs  -Overall stable symptoms, but notes continued awareness of palpitation/PVC.  Previous Holter monitor with 15% PVC burden.  TTE with normal LV systolic function, no significant valvular abnormality.  -Discussed cautious increase of metoprolol with monitoring of symptoms, receptive of trialing increase metoprolol succinate 37.5 mg daily.  - metoprolol succinate XL (TOPROL-XL) 25 MG 24 hr tablet; Take 1.5  tablets by mouth Daily. For palpitations and BP  Dispense: 90 tablet; Refill: 1  -Repeat TTE in approximately 1 year for surveillance  -Follow-up in approximately 3 months or sooner if needed    2. Palpitations  -Occasional premature beat.  Denies syncope/racing heart apart from anxiety episodes.  -Increase Toprol as above  - Ambulatory Referral to Sleep Medicine    3. Intermittent asthma, unspecified asthma severity, unspecified whether complicated  -Requests referral to  pulmonology  - Ambulatory Referral to Pulmonology    4. Disturbed sleep rhythm  -Request referral to  sleep medicine  - Ambulatory Referral to Sleep Medicine      Follow Up {Instructions Charge Capture  Follow-up Communications :23}     Return in about 3 months (around 6/9/2022) for María follow-up in clinic, PVC/palpitations.      Patient was given instructions and counseling regarding her condition or for health maintenance advice. Please see specific information pulled into the AVS if appropriate.  Patient was instructed to call the [facility] Center with any questions, concerns, or worsening symptoms.    Dictated Utilizing Dragon Dictation   Please note that portions of this note were completed with a voice recognition program.   Part of this note may be an electronic transcription/translation of spoken language to printed text using the Dragon Dictation System.

## 2022-05-19 DIAGNOSIS — I49.3 FREQUENT PVCS: ICD-10-CM

## 2022-05-19 RX ORDER — METOPROLOL SUCCINATE 25 MG/1
37.5 TABLET, EXTENDED RELEASE ORAL DAILY
Qty: 90 TABLET | Refills: 1
Start: 2022-05-19 | End: 2022-06-08 | Stop reason: DRUGHIGH

## 2022-06-08 ENCOUNTER — OFFICE VISIT (OUTPATIENT)
Dept: CARDIOLOGY | Facility: HOSPITAL | Age: 37
End: 2022-06-08

## 2022-06-08 VITALS
RESPIRATION RATE: 18 BRPM | HEIGHT: 69 IN | HEART RATE: 73 BPM | TEMPERATURE: 98.2 F | WEIGHT: 293 LBS | BODY MASS INDEX: 43.4 KG/M2 | SYSTOLIC BLOOD PRESSURE: 136 MMHG | DIASTOLIC BLOOD PRESSURE: 73 MMHG | OXYGEN SATURATION: 98 %

## 2022-06-08 DIAGNOSIS — I49.3 FREQUENT PVCS: Primary | ICD-10-CM

## 2022-06-08 PROCEDURE — 99213 OFFICE O/P EST LOW 20 MIN: CPT | Performed by: NURSE PRACTITIONER

## 2022-06-08 RX ORDER — METOPROLOL SUCCINATE 50 MG/1
50 TABLET, EXTENDED RELEASE ORAL DAILY
Qty: 90 TABLET | Refills: 1 | Status: SHIPPED | OUTPATIENT
Start: 2022-06-08 | End: 2022-09-07 | Stop reason: DRUGHIGH

## 2022-06-08 NOTE — PROGRESS NOTES
"Chief Complaint  Follow-up and Palpitations    Subjective    History of Present Illness {CC  Problem List  Visit  Diagnosis   Encounters  Notes  Medications  Labs  Result Review Imaging  Media :23}     Regla Holliday presents to [facility] CARDIOLOGY for   Mrs. Holliday follows up for medication refills and reassessment of palpitations/ frequent PVC's.    Daytime fatigue improved with changing to night time Metoprolol dosing.  However, she feels headache and increased heart rate within the last couple hours before dose is due.         Objective     Vital Signs:   Vitals:    06/08/22 1523   BP: 136/73   BP Location: Left arm   Patient Position: Sitting   Cuff Size: Adult   Pulse: 73   Resp: 18   Temp: 98.2 °F (36.8 °C)   TempSrc: Temporal   SpO2: 98%   Weight: (!) 146 kg (322 lb 8 oz)   Height: 175.3 cm (69\")     Body mass index is 47.62 kg/m².  Physical Exam  Vitals reviewed.   Constitutional:       General: She is not in acute distress.     Appearance: She is not toxic-appearing.   HENT:      Head: Normocephalic.   Cardiovascular:      Rate and Rhythm: Normal rate and regular rhythm.      Heart sounds: No murmur heard.    No friction rub. No gallop.   Pulmonary:      Effort: Pulmonary effort is normal.      Breath sounds: Normal breath sounds.   Musculoskeletal:         General: Normal range of motion.   Skin:     General: Skin is warm and dry.      Capillary Refill: Capillary refill takes less than 2 seconds.   Neurological:      General: No focal deficit present.      Mental Status: She is alert and oriented to person, place, and time.          Result Review  Data Reviewed:{ Labs  Result Review  Imaging  Med Tab  Media :23}     Cardiology studies :   Adult Transthoracic Echo Complete w/ Color, Spectral and Contrast if necessary per protocol (10/19/2021 17:04)           Assessment and Plan {CC Problem List  Visit Diagnosis  ROS  Review (Popup)  Health Maintenance  Quality  " BestPractice  Medications  SmartSets  SnapShot Encounters  Media :23}   1. Frequent PVCs  - LVEF WNO per echo 10/19/21  - late day break thru tachycardia and most likely HTN (headache)  - increase metoprolol xl to 50 mg QPM (90 day supply)  - re-visit in three months.  Will be time to order recheck echo for October then      25 min    Follow Up {Instructions Charge Capture  Follow-up Communications :23}   Return in about 3 months (around 9/8/2022).    Patient was given instructions and counseling regarding her condition or for health maintenance advice. Please see specific information pulled into the AVS if appropriate.  Patient was instructed to call the Heart [facility] Center with any questions, concerns, or worsening symptoms.

## 2022-09-07 ENCOUNTER — OFFICE VISIT (OUTPATIENT)
Dept: CARDIOLOGY | Facility: HOSPITAL | Age: 37
End: 2022-09-07

## 2022-09-07 VITALS
SYSTOLIC BLOOD PRESSURE: 125 MMHG | DIASTOLIC BLOOD PRESSURE: 64 MMHG | OXYGEN SATURATION: 99 % | RESPIRATION RATE: 20 BRPM | BODY MASS INDEX: 43.4 KG/M2 | HEIGHT: 69 IN | WEIGHT: 293 LBS | HEART RATE: 77 BPM | TEMPERATURE: 97.3 F

## 2022-09-07 DIAGNOSIS — I49.3 FREQUENT PVCS: Primary | ICD-10-CM

## 2022-09-07 PROCEDURE — 99213 OFFICE O/P EST LOW 20 MIN: CPT | Performed by: NURSE PRACTITIONER

## 2022-09-07 RX ORDER — PRAZOSIN HYDROCHLORIDE 1 MG/1
1 CAPSULE ORAL
COMMUNITY
Start: 2022-07-14 | End: 2022-12-12

## 2022-09-07 RX ORDER — MIRTAZAPINE 15 MG/1
7.5 TABLET, FILM COATED ORAL NIGHTLY PRN
COMMUNITY
Start: 2022-07-18 | End: 2022-12-12

## 2022-09-07 RX ORDER — METOPROLOL SUCCINATE 25 MG/1
25 TABLET, EXTENDED RELEASE ORAL DAILY
Qty: 90 TABLET | Refills: 1
Start: 2022-09-07 | End: 2022-11-11

## 2022-09-07 NOTE — PROGRESS NOTES
"Chief Complaint  Palpitations and Follow-up    Subjective    History of Present Illness {CC  Problem List  Visit  Diagnosis   Encounters  Notes  Medications  Labs  Result Review Imaging  Media :23}     Regla Holliday presents to [facility] CARDIOLOGY for   MsMarii Holliday comes in for three month follow up on Metoprolol dosing.  She takes this regularly for frequent PVC's.  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  She has been home x 4 weeks.  Patient reports she is beginning to exercise regularly and feeling well.  However, her HR and BP are very low at times, especially in th AM.  (she takes metoprolol @ [facility])       Objective     Vital Signs:   Vitals:    09/07/22 1536   BP: 125/64   BP Location: Left arm   Patient Position: Sitting   Cuff Size: Large Adult   Pulse: 77   Resp: 20   Temp: 97.3 °F (36.3 °C)   TempSrc: Temporal   SpO2: 99%   Weight: (!) 148 kg (326 lb)   Height: 175.3 cm (69\")     Body mass index is 48.14 kg/m².  Physical Exam  Vitals reviewed.   Constitutional:       General: She is not in acute distress.     Appearance: Normal appearance. She is not toxic-appearing.   HENT:      Head: Normocephalic and atraumatic.   Cardiovascular:      Rate and Rhythm: Normal rate and regular rhythm.      Pulses: Normal pulses.      Heart sounds: No murmur heard.    No friction rub. No gallop.   Pulmonary:      Effort: Pulmonary effort is normal.      Breath sounds: Normal breath sounds.   Musculoskeletal:      Right lower leg: No edema.      Left lower leg: No edema.   Skin:     General: Skin is warm and dry.   Neurological:      General: No focal deficit present.      Mental Status: She is alert and oriented to person, place, and time.   Psychiatric:         Mood and Affect: Mood normal.         Behavior: Behavior normal.        Result Review  Data Reviewed:{ Labs  Result Review  Imaging  Med Tab  Media :23}     Cardiology " studies :   ECG 12 Lead (12/01/2021 19:32)         Assessment and Plan {CC Problem List  Visit Diagnosis  ROS  Review (Popup)  Health Maintenance  Quality  BestPractice  Medications  SmartSets  SnapShot Encounters  Media :23}   1. Frequent PVCs  - Extended holter monitor 1/28/21 15% PVC burden  - Currently controlled on Metoprolol xl 50 mg daily  - due to AM bradycardia and AM hypotension, reduce to 25 mg daily  - patient would like to try and wean off BB all together, we can work on this and then consider re-testing holter to re-assess PVC burden.      25 min    Follow Up {Instructions Charge Capture  Follow-up Communications :23}   Return in about 1 month (around 10/7/2022).    Patient was given instructions and counseling regarding her condition or for health maintenance advice. Please see specific information pulled into the AVS if appropriate.  Patient was instructed to call the Heart and Valve Center with any questions, concerns, or worsening symptoms.

## 2022-10-13 ENCOUNTER — OFFICE VISIT (OUTPATIENT)
Dept: CARDIOLOGY | Facility: HOSPITAL | Age: 37
End: 2022-10-13

## 2022-10-13 ENCOUNTER — HOSPITAL ENCOUNTER (OUTPATIENT)
Dept: CARDIOLOGY | Facility: HOSPITAL | Age: 37
Discharge: HOME | End: 2022-10-13

## 2022-10-13 VITALS
WEIGHT: 293 LBS | SYSTOLIC BLOOD PRESSURE: 133 MMHG | TEMPERATURE: 97.6 F | HEIGHT: 69 IN | RESPIRATION RATE: 20 BRPM | OXYGEN SATURATION: 100 % | DIASTOLIC BLOOD PRESSURE: 81 MMHG | HEART RATE: 85 BPM | BODY MASS INDEX: 43.4 KG/M2

## 2022-10-13 DIAGNOSIS — I49.3 FREQUENT PVCS: ICD-10-CM

## 2022-10-13 DIAGNOSIS — I49.3 FREQUENT PVCS: Primary | ICD-10-CM

## 2022-10-13 DIAGNOSIS — R00.2 PALPITATIONS: ICD-10-CM

## 2022-10-13 PROCEDURE — 99214 OFFICE O/P EST MOD 30 MIN: CPT | Performed by: NURSE PRACTITIONER

## 2022-10-13 PROCEDURE — 93246 EXT ECG>7D<15D RECORDING: CPT

## 2022-10-13 NOTE — PROGRESS NOTES
[facility] Heart Monitor Documentation    Regla Holliday  1985  [id number]  10/13/22      [] ZIO XT Patch  Model [id number] Prescribed for N/A Days    · Serial Number: (N + 9 Digits) N   · Apply-By Date on Box:   · USPS Tracking Number:   · USPS Tracking        [] [employer]ian MINI PLUS Mobile Cardiac Telemetry  Model BGMINIPLUS Prescribed for N/A Days    · Serial Number: (BGM + 7 Digits) BGM  · Shipped-By Date on Box:   · UPS Tracking Number: [id number]  · UPS Tracking      [] [employer] MINI Holter Monitor  Model BGMINIEL Prescribed for 7 Days    · Serial Number: (7 Digits) [id number]  · Shipped-By Date on Box: [id number]  · UPS Tracking Number: [id number]  · UPS Tracking        This monitor was applied to the patient's chest and checked for proper functioning.  Ms. Regla Holliday was instructed in the proper use of this monitor.  She was given the opportunity to ask questions and left the office with the device 's instruction manual.    Henny Rashid MA, 16:28 EDT, 10/13/22                  [facility] 8.8.2019

## 2022-10-13 NOTE — PROGRESS NOTES
"Chief Complaint  Irregular Heart Beat and Follow-up    Subjective    History of Present Illness {CC  Problem List  Visit  Diagnosis   Encounters  Notes  Medications  Labs  Result Review Imaging  Media :23}     Regla Holliday, 37 y.o. female with PVCs, left breast CA s/p left/right mastectomy at  presents to [facility] Heart and Valve clinic for Irregular Heart Beat and Follow-up.    Patient previously evaluated in heart valve clinic and reported heart rate and blood pressure very low at times especially in the morning after taking metoprolol night before.  Patient was instructed to reduce Toprol-XL from 50 Mg to 25 Mg daily.  Patient discussed interest in weaning off beta-blocker altogether.  Patient with previous Holter monitor January 2021 with 15% PVC burden. Subsequent TTE with normal LVEF/diastolic function.    Patient presents today feeling well overall, but with continued intermittent palpitation symptoms. Symptoms are improved since original PVC diagnosis and evaluation, but continues with bothersome palpitation symptoms. Would like to consider alternative therapies besides beta-blocker, possibly options to cease all medications for PVCs. She had increased her activity lately and focusing on weight loss. She denies anginal symptoms, dyspnea, tachypalpitation, near syncope/syncope.      Objective     Vital Signs:   Vitals:    10/13/22 1523   BP: 133/81   BP Location: Left arm   Patient Position: Sitting   Cuff Size: Adult   Pulse: 85   Resp: 20   Temp: 97.6 °F (36.4 °C)   TempSrc: Temporal   SpO2: 100%   Weight: (!) 149 kg (329 lb 4 oz)   Height: 175.3 cm (69\")     Body mass index is 48.62 kg/m².  Physical Exam  Vitals and nursing note reviewed.   Constitutional:       Appearance: Normal appearance. She is obese.   HENT:      Head: Normocephalic.   Eyes:      Extraocular Movements: Extraocular movements intact.   Neck:      Vascular: No carotid bruit.   Cardiovascular:      Rate and Rhythm: " Normal rate and regular rhythm.      Pulses: Normal pulses.      Heart sounds: Normal heart sounds, S1 normal and S2 normal. No murmur heard.  Pulmonary:      Effort: Pulmonary effort is normal. No respiratory distress.      Breath sounds: Normal breath sounds.   Musculoskeletal:      Cervical back: Neck supple.      Right lower leg: No edema.      Left lower leg: No edema.   Skin:     General: Skin is warm and dry.   Neurological:      General: No focal deficit present.      Mental Status: She is alert.   Psychiatric:         Mood and Affect: Mood normal.         Behavior: Behavior normal.         Thought Content: Thought content normal.        Data Reviewed:{ Labs  Result Review  Imaging  Med Tab  Media :23}     ECG 12 Lead (12/01/2021 19:32)  Adult Transthoracic Echo Complete w/ Color, Spectral and Contrast if necessary per protocol (10/19/2021 17:04)  Holter Monitor - 72 Hour Up To 15 Days (01/28/2021 16:26)  ECG 12 Lead (12/01/2021 19:32)  CBC (01/12/2022 07:38)  Comprehensive metabolic panel (01/12/2022 07:38)  T4, Free (12/01/2021 19:38)  TSH (12/01/2021 19:38)  Troponin (12/01/2021 19:38)  BNP (12/01/2021 19:38)      Assessment and Plan {CC Problem List  Visit Diagnosis  ROS  Review (Popup)  Health Maintenance  Quality  BestPractice  Medications  SmartSets  SnapShot Encounters  Media :23}     1. Frequent PVCs  -Overall stable symptoms, but notes continued awareness of palpitation/PVC.  Previous Holter monitor with 15% PVC burden.  TTE with normal LV systolic function, no significant valvular abnormality.  -Would like to explore options to stop beta-blocker.  -Recently decreased Toprol-XL  -Repeat 7 day Holter for PVC burden reevaluation  -Referral to EP for alternative pharmacologic versus ablation options, pending Holter monitor data    2. Palpitations  -Occasional premature beat.  Denies syncope/racing heart apart from anxiety episodes.  -Continue Toprol-XL 25mg daily    3. Intermittent  asthma  -Previously referred to  pulmonary/sleep medicine        Follow Up {Instructions Charge Capture  Follow-up Communications :23}     Return if symptoms worsen or fail to improve.      Patient was given instructions and counseling regarding her condition or for health maintenance advice. Please see specific information pulled into the AVS if appropriate.  Patient was instructed to call the Heart [facility] Valve Center with any questions, concerns, or worsening symptoms.    Dictated Utilizing Dragon Dictation   Please note that portions of this note were completed with a voice recognition program.   Part of this note may be an electronic transcription/translation of spoken language to printed text using the Dragon Dictation System.

## 2022-11-11 DIAGNOSIS — J45.909 ASTHMA DUE TO ENVIRONMENTAL ALLERGIES: ICD-10-CM

## 2022-11-11 RX ORDER — ALBUTEROL SULFATE 90 UG/1
AEROSOL, METERED RESPIRATORY (INHALATION)
Qty: 8.5 G | Refills: 2 | Status: SHIPPED | OUTPATIENT
Start: 2022-11-11

## 2022-11-11 RX ORDER — METOPROLOL SUCCINATE 25 MG/1
TABLET, EXTENDED RELEASE ORAL
Qty: 90 TABLET | Refills: 1 | Status: SHIPPED | OUTPATIENT
Start: 2022-11-11

## 2022-11-24 PROCEDURE — 93248 EXT ECG>7D<15D REV&INTERPJ: CPT | Performed by: INTERNAL MEDICINE

## 2022-12-07 PROBLEM — E66.813 CLASS 3 SEVERE OBESITY IN ADULT: Status: ACTIVE | Noted: 2022-12-07

## 2022-12-07 PROBLEM — N64.52 NIPPLE DISCHARGE: Status: RESOLVED | Noted: 2021-11-01 | Resolved: 2022-12-07

## 2022-12-07 PROBLEM — E66.01 CLASS 3 SEVERE OBESITY IN ADULT (HCC): Status: ACTIVE | Noted: 2022-12-07

## 2022-12-07 PROBLEM — N63.25 BREAST LUMP ON LEFT SIDE AT 3 O'CLOCK POSITION: Status: RESOLVED | Noted: 2021-11-01 | Resolved: 2022-12-07

## 2022-12-07 PROCEDURE — 93000 ELECTROCARDIOGRAM COMPLETE: CPT | Performed by: INTERNAL MEDICINE

## 2022-12-07 NOTE — PROGRESS NOTES
Cardiac Electrophysiology Outpatient Consult Note            [facility]    Consult Note     Regla Holliday  [id number]  12/12/2022  [phone]     Primary Care Physician: Katarzyna Felix DO    Referred By: Sebastian Nielsen APRN    Subjective     Chief Complaint:   Diagnoses and all orders for this visit:    1. Frequent PVCs (Primary)  -     ECG 12 Lead    2. Intraductal carcinoma in situ of left breast      Chief Complaint   Patient presents with   • PVC   • Shortness of Breath       History of Present Illness:   Regla Holliday is a 37 y.o. female who presents to my electrophysiology clinic for evaluation of the above complaints.  Regla tells me that she has symptoms of palpitations.  She has had these for many years certainly before her diagnosis of breast cancer.  She was diagnosed with breast cancer last Thanksgiving of 2021.  She underwent bilateral mastectomy in January.  There was ductal carcinoma in situ on the left side.  She had no chemotherapy no radiation therapy.    Palpitations are associated with a fair bit of anxiety.  She is worried about what this may mean in terms of her overall risk of morbidity mortality.  She still recuperating from breast surgery specifically mastectomy.  At some point after she loses weight she is interested in perhaps starting a family with her .    She and her  are restauranteurs.    Past Medical History:   Patient Active Problem List    Diagnosis Date Noted   • Class 3 severe obesity in adult (HCC) 12/07/2022   • Intraductal carcinoma in situ of left breast 12/14/2021     Note Last Updated: 12/14/2021     High-grade ductal carcinoma in situ, Estrogen receptor-positive, Progesterone receptor-positive.       • Habitual aborter not currently pregnant 11/01/2021   • Frequent PVCs 01/28/2021     Note Last Updated: 12/7/2022     · 6-day Holter monitor, 1/28/2021: 15% PVC burden  · Echocardiogram,  "10/19/2021: Normal EF, normal valvular morphology  · 7-day Holter monitor, 2022: PVC burden 0.07%         Past Surgical History:   Past Surgical History:   Procedure Laterality Date   • MASTECTOMY Bilateral 2022   • MOUTH SURGERY     • NASAL SEPTUM SURGERY     • TONSILLECTOMY AND ADENOIDECTOMY         Social History:   Social History     Socioeconomic History   • Marital status:    • Number of children: 0   Tobacco Use   • Smoking status: Former     Types: Cigarettes     Quit date: 2020     Years since quittin.2   • Smokeless tobacco: Never   Substance and Sexual Activity   • Alcohol use: Not Currently     Comment: none   • Drug use: Defer   • Sexual activity: Yes     Partners: Male     Birth control/protection: I.U.D.     Comment: Paragard IUD since        Medications:     Current Outpatient Medications:   •  albuterol sulfate  (90 Base) MCG/ACT inhaler, INHALE TWO PUFFS BY MOUTH EVERY 4 HOURS AS NEEDED FOR WHEEZING OR SHORTNESS OF AIR, Disp: 8.5 g, Rfl: 2  •  B Complex-C (B-complex with vitamin C) tablet, Take 1 tablet by mouth Daily., Disp: , Rfl:   •  budesonide-formoterol (Symbicort) 80-4.5 MCG/ACT inhaler, Inhale 2 puffs 2 (Two) Times a Day., Disp: 1 each, Rfl: 12  •  Cholecalciferol 75 MCG (3000 UT) tablet, Take 3,000 Units by mouth Daily., Disp: , Rfl:   •  metoprolol succinate XL (TOPROL-XL) 25 MG 24 hr tablet, TAKE ONE TABLET BY MOUTH DAILY FOR PVC'S, Disp: 90 tablet, Rfl: 1  •  Paragard Intrauterine Copper intrauterine device IUD, ParaGard T 380A 380 square mm intrauterine device, Disp: , Rfl:     Allergies:   Allergies   Allergen Reactions   • Ibuprofen Hives   • Naproxen Hives   • Nsaids Hives       Objective   Vital Signs:   Vitals:    22 0859   BP: 134/70   BP Location: Left arm   Patient Position: Sitting   Pulse: 72   SpO2: 98%   Weight: (!) 153 kg (338 lb)   Height: 175.3 cm (69\")       PHYSICAL EXAM  General appearance: Awake, alert, " "cooperative  Head: Normocephalic, without obvious abnormality, atraumatic  Eyes: Conjunctivae/corneas clear, EOMs intact  Neck: no adenopathy, no carotid bruit, no JVD and thyroid: not enlarged  Lungs: clear to auscultation bilaterally and no rhonchi or crackles\", ' symmetric  Heart: regular rate and rhythm, S1, S2 normal, no murmur, click, rub or gallop  Abdomen: Soft, non-tender, bowel sounds normal,  no organomegaly  Extremities: extremities normal, atraumatic, no cyanosis or edema  Skin: Skin color, turgor normal, no rashes or lesions  Neurologic: Grossly normal     Lab Results   Component Value Date    GLUCOSE 102 (H) 12/01/2021    CALCIUM 9.2 01/12/2022     01/12/2022    K 4.2 01/12/2022    CO2 18 (L) 01/12/2022     01/12/2022    BUN 14 01/12/2022    CREATININE 0.94 01/12/2022    EGFRIFAFRI >60 01/12/2022    EGFRIFNONA >60 01/12/2022    BCR 15 01/12/2022    ANIONGAP 14 01/12/2022     Lab Results   Component Value Date    WBC 6.15 01/12/2022    HGB 13.5 01/12/2022    HCT 42.4 01/12/2022    MCV 89 01/12/2022     01/12/2022     Lab Results   Component Value Date    INR 0.9 11/03/2021     Lab Results   Component Value Date    TSH 3.190 12/01/2021    J6SVMSW 8.72 11/03/2021       Cardiac Testing:      I personally viewed and interpreted the patient's EKG/Telemetry/lab data      ECG 12 Lead    Date/Time: 12/7/2022 3:19 PM  Performed by: Josué Bain DO  Authorized by: Josué Bain DO   Previous ECG: no previous ECG available  Rhythm: sinus rhythm  Ectopy: unifocal PVCs            Tobacco Cessation: N/A  Obstructive Sleep Apnea Screening: Completed    Assessment & Plan    Diagnoses and all orders for this visit:    1. Frequent PVCs (Primary)  -     ECG 12 Lead    2. Intraductal carcinoma in situ of left breast         Diagnosis Plan   1. Frequent PVCs   structurally normal heart by echocardiography.  Normal EKG otherwise.    History of left-sided breast cancer with bilateral mastectomy " however no exposure to chemotherapy or radiation.    Symptoms of PVCs predate diagnosis of breast cancer.    Potentially untreated undiagnosed sleep apnea.  She is going to get a inpatient sleep study and this is already set.    Risk stratification will require cardiac MRI.    PVC burden is less than 20%.    Lengthy conversation with her that the first and foremost our job is to her stratify her.  Carefully and thoroughly.  If her MRI fails to demonstrate any cardiac scar and demonstrates a structurally normal heart then I think she will be low risk.  At that point no further testing or work-up would be required.    We discussed that at that point we would shift to a strategy of symptom reduction as appropriate in her judgment.  At this point she says really her only complaint is from the metoprolol which makes her feel tired and fatigued.  We agreed that we will stop this today.    We discussed the option of calcium channel blocker therapy either regular doses or perhaps a short acting as needed medication such as diltiazem.  At this point she says that her symptoms from the PVC are mostly related to fear and concern of what the PVC means in terms of her overall long-term prognosis.    At this time she does not wish to initiate any medical therapy.    We did discuss the option of catheter ablation.  This was discussed in full detail.  We spent about 15 minutes on the subject alone.  She asked excellent questions.  At this time both of us are in agreement that this is not a first-line option for her.    We did discuss that it is only since January since she had her bilateral mastectomy.      2. Intraductal carcinoma in situ of left breast   ductal carcinoma in situ.  Left-sided.  Bilateral mastectomy.  Genetic testing nondiagnostic.  No chemotherapy.  No radiation.          Body mass index is 49.91 kg/m².    I spent 43 minutes in consultation with this patient which included more than 65% of this time in direct  face-to-face counseling, physical examination and discussion of my assessment and findings and shared decision making with the patient.  The remainder of the time not spent face to face was performing one, some or all of the following actions:  preparing to see this patient ( eg. Review of tests),  ordering medications, tests or procedures ), care coordination, discussion of the plan with other healthcare providers, documenting clinical information in Epic well as independently interpreting results and communicating results to patient, family and or caregiver.  All time noted occurred on the date of service.    Follow Up:       Thank you for allowing me to participate in the care of your patient. Please to not hesitate to contact me with additional questions or concerns.      Josué Bain DO, FACC, RS  Cardiac Electrophysiologist  [facility]

## 2022-12-12 ENCOUNTER — OFFICE VISIT (OUTPATIENT)
Dept: CARDIOLOGY | Facility: CLINIC | Age: 37
End: 2022-12-12

## 2022-12-12 VITALS
DIASTOLIC BLOOD PRESSURE: 70 MMHG | WEIGHT: 293 LBS | SYSTOLIC BLOOD PRESSURE: 134 MMHG | BODY MASS INDEX: 43.4 KG/M2 | HEIGHT: 69 IN | OXYGEN SATURATION: 98 % | HEART RATE: 72 BPM

## 2022-12-12 DIAGNOSIS — I48.91 ATRIAL FIBRILLATION, UNSPECIFIED TYPE: ICD-10-CM

## 2022-12-12 DIAGNOSIS — D05.12 INTRADUCTAL CARCINOMA IN SITU OF LEFT BREAST: ICD-10-CM

## 2022-12-12 DIAGNOSIS — I49.3 FREQUENT PVCS: Primary | ICD-10-CM

## 2022-12-12 PROBLEM — I48.0 PAF (PAROXYSMAL ATRIAL FIBRILLATION): Status: ACTIVE | Noted: 2022-12-12

## 2022-12-12 PROBLEM — I48.0 PAF (PAROXYSMAL ATRIAL FIBRILLATION) (HCC): Status: RESOLVED | Noted: 2022-12-12 | Resolved: 2022-12-12

## 2022-12-12 PROCEDURE — 99214 OFFICE O/P EST MOD 30 MIN: CPT | Performed by: INTERNAL MEDICINE

## 2022-12-14 RX ORDER — DILTIAZEM HYDROCHLORIDE 60 MG/1
TABLET, FILM COATED ORAL
Qty: 10.2 G | OUTPATIENT
Start: 2022-12-14

## 2023-01-03 ENCOUNTER — OFFICE VISIT (OUTPATIENT)
Dept: OBSTETRICS AND GYNECOLOGY | Facility: CLINIC | Age: 38
End: 2023-01-03
Payer: COMMERCIAL

## 2023-01-03 VITALS
DIASTOLIC BLOOD PRESSURE: 88 MMHG | WEIGHT: 293 LBS | HEIGHT: 69 IN | SYSTOLIC BLOOD PRESSURE: 130 MMHG | BODY MASS INDEX: 43.4 KG/M2

## 2023-01-03 DIAGNOSIS — E66.01 MORBID OBESITY WITH BMI OF 50.0-59.9, ADULT: ICD-10-CM

## 2023-01-03 DIAGNOSIS — Z00.00 ANNUAL PHYSICAL EXAM: Primary | ICD-10-CM

## 2023-01-03 PROCEDURE — 99395 PREV VISIT EST AGE 18-39: CPT | Performed by: OBSTETRICS & GYNECOLOGY

## 2023-01-03 NOTE — PROGRESS NOTES
Gynecologic Annual Exam Note        Gynecologic Exam (Annual with Removal of Paragard IUD)        Subjective     HPI  Regla Holliday is a 37 y.o.  female who presents for annual well woman exam as a established patient. There were no changes to her medical or surgical history since her last visit.. Patient reports problems with: none. Patient's last menstrual period was 2022.. Her periods occur every 25-35 days , lasting 5 days. The flow is Heavy to light.. She reports dysmenorrhea is moderate, occurring first 1-2 days of flow.     Partner Status: Marital Status: .  She is sexually active. She has not had new partners.. STD testing recommendations have been explained to the patient and she does not desire STD testing.    Additional OB/GYN History   Current contraception: contraceptive methods: IUD.  Insertion date: 2012  Desires to: stop contraception  Thromboembolic Disease: none  Age of menarche: 12    History of STD: no    Last Pap : 2021. Results: non-diagnostic . HPV: negative  Last Completed Pap Smear          PAP SMEAR (Every 3 Years) Next due on 1/3/2026    2023  LIQUID-BASED PAP SMEAR, [facility] ([facility])    2021  SCANNED - PAP SMEAR    2021  SCANNED - PAP SMEAR                 History of abnormal Pap smear: yes -   Gardasil status:completed  Family history of uterine, colon, breast, or ovarian cancer: yes - PA had breast cancer, PGM had colon cancer  Performs monthly Self-Breast Exam: no  Exercises Regularly:yes  Feelings of Anxiety or Depression: no  Tobacco Usage?: No       Current Outpatient Medications:   •  albuterol sulfate  (90 Base) MCG/ACT inhaler, INHALE TWO PUFFS BY MOUTH EVERY 4 HOURS AS NEEDED FOR WHEEZING OR SHORTNESS OF AIR, Disp: 8.5 g, Rfl: 2  •  B Complex-C (B-complex with vitamin C) tablet, Take 1 tablet by mouth Daily., Disp: , Rfl:   •  budesonide-formoterol (Symbicort) 80-4.5 MCG/ACT inhaler, Inhale 2 puffs 2 (Two)  Times a Day., Disp: 1 each, Rfl: 12  •  Cholecalciferol 75 MCG (3000 UT) tablet, Take 3,000 Units by mouth Daily., Disp: , Rfl:   •  metoprolol succinate XL (TOPROL-XL) 25 MG 24 hr tablet, TAKE ONE TABLET BY MOUTH DAILY FOR PVC'S, Disp: 90 tablet, Rfl: 1  •  Paragard Intrauterine Copper intrauterine device IUD, ParaGard T 380A 380 square mm intrauterine device, Disp: , Rfl:      Patient denies the need for medication refills today.    OB History        3    Para        Term                AB   3    Living           SAB   3    IAB        Ectopic        Molar        Multiple        Live Births                    Health Maintenance   Topic Date Due   • Pneumococcal Vaccine 0-64 (1 - PCV) Never done   • TDAP/TD VACCINES (1 - Tdap) Never done   • COVID-19 Vaccine (4 - Booster for Pfizer series) 2022   • INFLUENZA VACCINE  2022   • Annual Gynecologic Pelvic and Breast Exam  2022   • ANNUAL PHYSICAL  2024   • PAP SMEAR  2026   • HEPATITIS C SCREENING  Completed       Past Medical History:   Diagnosis Date   • ADHD    • Breast cancer (HCC)    • Celiac disease    • History of echocardiogram    • History of Holter monitoring    • Panic attacks         Past Surgical History:   Procedure Laterality Date   • MASTECTOMY Bilateral 2022   • MOUTH SURGERY     • NASAL SEPTUM SURGERY     • TONSILLECTOMY AND ADENOIDECTOMY         The additional following portions of the patient's history were reviewed and updated as appropriate: allergies, current medications, past family history, past medical history, past social history, past surgical history and problem list.    Review of Systems      I have reviewed and agree with the HPI, ROS, and historical information as entered above. Blanca Archibald MD        Objective   /88   Ht 175.3 cm (69.02\")   Wt (!) 154 kg (340 lb 3.2 oz)   LMP 2022   BMI 50.22 kg/m²     Physical Exam    General:  well developed; well nourished  no  acute distress, obese BMI 50.  Skin:  No suspicious lesions seen  Thyroid: normal to inspection and palpation  Breasts:  Examined in supine position, s/p bilateral mastecomy.  Symmetric without masses or skin dimpling, scars are even.  There are no palpable axillary nodes  CVS: RRR, no M/R/G, distal pulses wnl  Resp: CTAB, No W/R/R  Abdomen: soft, non-tender; no masses  no umbilical or inguinal hernias are present  no hepato-splenomegaly  Pelvis: Clinical staff was present for exam  External genitalia:  normal appearance of the external genitalia including Bartholin's and [place]'s glands.  Urethra: no masses or tenderness  Urethral meatus: normal size;  No lesions or signs of prolapse  Bladder: non tender to palpation, no masses, no prolapse  Vagina:  normal pink mucosa without prolapse or lesions.  Cervix:  normal appearance.  Strings visible on exam but when pulled, 1 arm came off.  Uterus:  normal size, shape and consistency.  Adnexa:  normal bimanual exam of the adnexa.  Perineum/Anus: normal appearance, no external hemorrhoids  Ext: 2+ pulses, no edema       Assessment and Plan    Problem List Items Addressed This Visit    None  Visit Diagnoses     Annual physical exam    -  Primary    Relevant Orders    LIQUID-BASED PAP SMEAR, [facility]) (Completed)    US Non-ob Transvaginal    Morbid obesity with BMI of 50.0-59.9, adult (HCC)        Relevant Orders    DHEA-Sulfate    Estradiol    Follicle Stimulating Hormone    Luteinizing Hormone    CBC (No Diff)    17-Hydroxyprogesterone    TSH    Testosterone Free Direct    Hemoglobin A1c          1. GYN annual well woman exam.   2. Reviewed pap guidelines. Pap done today  3. Patient considering attempting pregnancy.  PCOS panel done for that and symptoms.  4. With retained paragard arm, will get sono to see location.  5. Reviewed BMI and weight loss as preventative health measures.   6. Preconceptual Counseling - Discussed cystic fibrosis screening, rubella  screening, chicken pox immunity, folic acid supplementation and HIV screening.   7. RTC in 1 year or PRN with problems  Return in about 1 week (around 1/10/2023) for WITH SONO.            Gynecologic Procedure Note        Procedure: IUD Removal     Procedures    Pre procedure indication     1) Desires Removal of IUD    Post procedure indication   1) Desires Removal of IUD    The patient presents today for removal of her IUD.  She requests removal of her IUD due to Device expiration. She plans to use no method for contraception. All her questions were answered and consents were signed.      The patient was placed in a dorsal lithotomy position on the examining table in [facility].  A speculum was inserted into the vagina and the cervix was brought into view.  An IUD string was visualized.  The string was then grasped and removed but did require a moderate amount of traction.  And so it was no surprise that one of the paragard arms came off and was retained.  There was a Minimal amount of bleeding and cramping.    All  instruments were removed from the vagina.       The patient tolerated the procedure well with a moderate amount of discomfort.  She was monitored for 10 minutes prior to discharge.      There were no complications.    The patient was counseled on other options for birth control. She plans to use no method for contraception.     Problem List Items Addressed This Visit    None  Visit Diagnoses     Annual physical exam    -  Primary    Relevant Orders    LIQUID-BASED PAP SMEAR, [facility]) (Completed)    US Non-ob Transvaginal    Morbid obesity with BMI of 50.0-59.9, adult (HCC)        Relevant Orders    DHEA-Sulfate    Estradiol    Follicle Stimulating Hormone    Luteinizing Hormone    CBC (No Diff)    17-Hydroxyprogesterone    TSH    Testosterone Free Direct    Hemoglobin A1c          Blanca Archibald MD  01/03/2023

## 2023-01-04 LAB — REF LAB TEST METHOD: NORMAL

## 2023-01-09 ENCOUNTER — OFFICE VISIT (OUTPATIENT)
Dept: OBSTETRICS AND GYNECOLOGY | Facility: CLINIC | Age: 38
End: 2023-01-09
Payer: COMMERCIAL

## 2023-01-09 VITALS
HEIGHT: 69 IN | SYSTOLIC BLOOD PRESSURE: 126 MMHG | DIASTOLIC BLOOD PRESSURE: 80 MMHG | BODY MASS INDEX: 43.4 KG/M2 | WEIGHT: 293 LBS

## 2023-01-09 DIAGNOSIS — T83.31XD MECHANICAL BREAKDOWN OF INTRAUTERINE CONTRACEPTIVE DEVICE, SUBSEQUENT ENCOUNTER: Primary | ICD-10-CM

## 2023-01-09 PROBLEM — T83.9XXA IUD COMPLICATION (HCC): Status: ACTIVE | Noted: 2023-01-09

## 2023-01-09 PROBLEM — Z30.9 CONTRACEPTION MANAGEMENT: Status: ACTIVE | Noted: 2023-01-09

## 2023-01-09 PROCEDURE — 99212 OFFICE O/P EST SF 10 MIN: CPT | Performed by: OBSTETRICS & GYNECOLOGY

## 2023-01-09 RX ORDER — DIPHENOXYLATE HYDROCHLORIDE AND ATROPINE SULFATE 2.5; .025 MG/1; MG/1
TABLET ORAL
COMMUNITY

## 2023-01-09 NOTE — PROGRESS NOTES
"    Chief Complaint   Patient presents with   • Gynecologic Exam         Subjective   HPI  Regla Holliday is a 37 y.o. female, , who presents for IUD check follow up.  She had a ParaGard placed on 2012. Patient is here today for follow up on retained arm of Paragard after removal on 2023. Patient states retained arm fell out this morning while she was in the shower.  The additional following portions of the patient's history were reviewed and updated as appropriate: allergies, current medications, past family history, past medical history, past social history, past surgical history and problem list.    Did the patient have u/s today? Yes.  Findings showed IUD was not present.  I have personally evaluated the U/S and agree with the findings. Blanca Archibald MD    Review of Systems  All other systems reviewed and are negative.     I have reviewed and agree with the HPI, ROS, and historical information as entered above. Blanca Archibald MD    Objective   /80   Ht 175.3 cm (69.02\")   Wt (!) 154 kg (339 lb 9.6 oz)   LMP 2022   BMI 50.13 kg/m²     Physical Exam     Physical Exam:  General:  well developed; well nourished  no acute distress   Abdomen: soft, non-tender; no masses  no umbilical or inguinal hernias are present   Pelvis: Not performed.       Assessment & Plan     Assessment     Problem List Items Addressed This Visit     IUD complication (HCC) - Primary       Plan     1. Reassurance given.  No IUD seen on sono.  Return in about 1 year (around 2024) for Annual physical.      Blanca Archibald MD  2023    "

## 2023-03-31 DIAGNOSIS — R06.02 SHORTNESS OF BREATH: ICD-10-CM

## 2023-03-31 DIAGNOSIS — I49.3 FREQUENT PVCS: Primary | ICD-10-CM

## 2024-07-31 ENCOUNTER — TELEPHONE (OUTPATIENT)
Dept: OBSTETRICS AND GYNECOLOGY | Facility: CLINIC | Age: 39
End: 2024-07-31
Payer: COMMERCIAL

## 2024-07-31 NOTE — TELEPHONE ENCOUNTER
Caller: Regla Holliday    Relationship to patient: Self    Best call back number: [phone]    Chief complaint: POSITIVE PREGNANCY TEST, NEGATIVE RH FACTOR, HISTORY OF MISCARRIAGE, HISTORY OF BREAST CANCER    Type of visit: NEW OB & U/S - LMP 6/15/24    Requested date: ASAP     Additional notes: HUB UNABLE TO WARM TRANSFER CALL TO PRACTICE

## 2024-08-01 ENCOUNTER — TELEPHONE (OUTPATIENT)
Dept: OBSTETRICS AND GYNECOLOGY | Facility: CLINIC | Age: 39
End: 2024-08-01
Payer: COMMERCIAL

## 2024-08-01 NOTE — TELEPHONE ENCOUNTER
"Patient of Dr. Archbiald; LOV 01/09/2023. She is scheduled for NOB visit 08/14/24.  Returned patient's call.   LMP 06/15/24 = 6w 5d; approximate within a couple of days  States she removed a tick from the back of her neck this morning. States her  said the area was slightly red but no rash and no \"bullseye\".   Denies any fever or other symptoms.   Discussed with JAILENE Parekh. She recommends patient see her PCP or go to [facility].   Informed patient and requested she have any lab results faxed to our office. She v/u; hung up before I could provide fax number.     "

## 2024-08-01 NOTE — TELEPHONE ENCOUNTER
Provider:  DR DIAMOND    Caller:NEVILLE CORTEZ    Phone Number:[phone]     Reason for Call:PATIENT IS CALLING WITH CONCERNS BECAUSE SHE JUST PULLED A TICK FROM HER NECK//IT WAS ABOUT A 1/2 OF A CM WHEN PULLED IT OFF//THE HEAD IS STILL ATTACHED//WHERE SHE PULLED IT OFF ON HER NECK IT WAS IN HER HAIRLINE AND IS A LITTLE RED//IS JUST CONCERNED AND NOW MORE THAN EVER SINCE SHE IS PREGNANT//SHE HAS GET THE TICK//ISN'T SURE WHAT SHE NEEDS TO DO PLEASE FOLLOW UP

## 2024-08-14 ENCOUNTER — OFFICE VISIT (OUTPATIENT)
Dept: OBSTETRICS AND GYNECOLOGY | Facility: CLINIC | Age: 39
End: 2024-08-14
Payer: COMMERCIAL

## 2024-08-14 VITALS
HEIGHT: 69 IN | BODY MASS INDEX: 34.54 KG/M2 | SYSTOLIC BLOOD PRESSURE: 102 MMHG | WEIGHT: 233.2 LBS | DIASTOLIC BLOOD PRESSURE: 70 MMHG

## 2024-08-14 DIAGNOSIS — Z67.91 RH NEGATIVE STATUS DURING PREGNANCY IN FIRST TRIMESTER: ICD-10-CM

## 2024-08-14 DIAGNOSIS — F41.9 ANXIETY: ICD-10-CM

## 2024-08-14 DIAGNOSIS — O20.9 FIRST TRIMESTER BLEEDING: Primary | ICD-10-CM

## 2024-08-14 DIAGNOSIS — O03.4 INCOMPLETE ABORTION: ICD-10-CM

## 2024-08-14 DIAGNOSIS — R45.851 SUICIDAL IDEATION: ICD-10-CM

## 2024-08-14 DIAGNOSIS — O26.891 RH NEGATIVE STATUS DURING PREGNANCY IN FIRST TRIMESTER: ICD-10-CM

## 2024-08-14 DIAGNOSIS — F32.A DEPRESSION, UNSPECIFIED DEPRESSION TYPE: ICD-10-CM

## 2024-08-14 RX ORDER — PRENATAL VIT NO.126/IRON/FOLIC 28MG-0.8MG
TABLET ORAL DAILY
COMMUNITY

## 2024-08-14 NOTE — PROGRESS NOTES
"    Chief Complaint   Patient presents with    Miscarriage          HPI  Regla Holliday is a 39 y.o. female, , who presents with  U/S without gestational sac present and vaginal bleeding .    Recent Tests:  She had a quantitative hCG pregnancy test that was done 1 day ago that was 32.    US today: Yes. Findings showed no evidence of gestational sac, endometrial thickness measuring 4.0MM, and left ovarian simple cyst measuring 28.8 x 20.8 x 22.6MM. I have personally evaluated the U/S and agree with the findings. Blanca Archibald MD  She has not had prenatal care. She complains of cramping pain.  The pain is located in her lower abdomen with L>R. Her past medical history is notable for spontaneous  and chemical pregnancy. She states that she began having spotting on 2024 that became heavier on 08/10/2024. She went to the ER on 08/10/2024 with heavy vaginal bleeding and clotting. She reports saturating through 3 pads within 4 hours. She reports having persistent anxiety and impending since of doom since her bleeding began. She reports having heart palpitations and chest pain last night. She reports going to [facility] ER last PM. She was given replacement magnesium and potassium in the ER for low normal magnesium and potassium levels. She was offered medication to help with anxiety while in the ER, but declined due to potential side effects of arrhythmias. She also reports having \"several irrational thoughts\". She states that she does not have intentional thoughts to harm herself, but has been thinking of death often. She states that she knows that these thoughts are not \"normal\" and is unsettling and worsening her anxiety. She states that she has been on Lexapro and Klonopin in the past with breast CA diagnosis. She states that she has also used Xanax PRN with breast CA diagnosis, but was concerned about taking it regularly so she was switched to Klonopin. She states that she has not been on these " "medications in 2 years. She is doing counseling and therapy sessions with a therapist every other week. She met with her therapist yesterday. She states that these meetings are via Zoom as this therapist is out of [place]. She would like to discuss medication and referral to a therapist for in person sessions.   Rh Status: Negative.  Rhogam was given on 08/10/2024 at  ER. PHQ-9: 19. Suicide risk: Low risk.   She also complains of nausea.    The additional following portions of the patient's history were reviewed and updated as appropriate: allergies and current medications.    Review of Systems   Constitutional: Negative.    HENT: Negative.     Eyes: Negative.    Respiratory: Negative.     Cardiovascular: Negative.    Gastrointestinal: Negative.    Endocrine: Negative.    Genitourinary:  Positive for pelvic pain and vaginal bleeding.   Musculoskeletal: Negative.    Skin: Negative.    Allergic/Immunologic: Negative.    Neurological: Negative.    Hematological: Negative.    Psychiatric/Behavioral:  Positive for suicidal ideas, depressed mood and stress. The patient is nervous/anxious.      All other systems reviewed and are negative.     I have reviewed and agree with the HPI, ROS, and historical information as entered above. Blanca Archibald MD      Objective   /70   Ht 175.3 cm (69\")   Wt 106 kg (233 lb 3.2 oz)   LMP 06/15/2024   BMI 34.44 kg/m²     Physical Exam  Physical Exam:  General:  well developed; well nourished  no acute distress   Abdomen: soft, non-tender; no masses  no umbilical or inguinal hernias are present   Pelvis: Clinical staff was present for exam  External genitalia:  normal appearance of the external genitalia including Bartholin's and [place]ch[place]'s glands.  :  urethral meatus normal;  Vaginal:  normal pink mucosa without prolapse or lesions.  Cervix:  normal appearance. cervical motion tenderness is absent;  Uterus:  normal size, shape and consistency.  Adnexa:  normal bimanual exam " of the adnexa. tenderness of the left adnexa to  deep palpation;         Assessment and Plan    Problem List Items Addressed This Visit       Incomplete     Relevant Orders    HCG, B-subunit, Quantitative (Completed)    Suicidal ideation    Rh negative status during pregnancy in first trimester    First trimester bleeding - Primary    Relevant Orders    HCG, B-subunit, Quantitative (Completed)    Depression    Relevant Orders    Ambulatory Referral to Behavioral Health     Other Visit Diagnoses       Anxiety        Relevant Orders    Ambulatory Referral to Behavioral Health            Likely missed Ab, although HCG did not drop as appropriate, cyst on left and left adnexal pain.  Cyst appears more simple today and no other signs of ectopic, but d/w patient very important to continue to follow betas to be sure decreasing normally.  Will go to [facility] psych ER, [facility] from here so that she gets care urgently, although I think that her risk of suicide currently is low.    Reviewed reasons for early miscarriages, but patient has had too many.  Reviewed normal antiphospholipid workup.  She may need to see YAMILETH.  She is not sure she wants to try again at this point.  Return in about 1 week (around 2024) for pending HCG results, WITH KIERAN.      Blanca Archibald MD  2024

## 2024-08-15 ENCOUNTER — LAB (OUTPATIENT)
Dept: OBSTETRICS AND GYNECOLOGY | Facility: CLINIC | Age: 39
End: 2024-08-15
Payer: COMMERCIAL

## 2024-08-15 ENCOUNTER — TELEPHONE (OUTPATIENT)
Dept: OBSTETRICS AND GYNECOLOGY | Facility: CLINIC | Age: 39
End: 2024-08-15
Payer: COMMERCIAL

## 2024-08-15 DIAGNOSIS — O03.4 INCOMPLETE ABORTION: ICD-10-CM

## 2024-08-15 DIAGNOSIS — O03.4 INCOMPLETE ABORTION: Primary | ICD-10-CM

## 2024-08-15 PROBLEM — O20.9 FIRST TRIMESTER BLEEDING: Status: ACTIVE | Noted: 2024-08-15

## 2024-08-15 PROBLEM — F32.A DEPRESSION: Status: ACTIVE | Noted: 2024-08-15

## 2024-08-15 LAB — HCG INTACT+B SERPL-ACNC: 28 MIU/ML

## 2024-08-15 NOTE — TELEPHONE ENCOUNTER
Attempted to call patient in regards to lab results and provider recommendations. Also wanted to touch base with [facility].

## 2024-08-15 NOTE — PROGRESS NOTES
Patient with a 3rd hcg done at a 3rd different lab (there can be a little difference between labs).  Please repeat late today or first thing in AM.  If not dropping, may need methotrexate.  So I put in CMP to be drawn with it too.

## 2024-08-16 ENCOUNTER — OFFICE VISIT (OUTPATIENT)
Dept: OBSTETRICS AND GYNECOLOGY | Facility: CLINIC | Age: 39
End: 2024-08-16
Payer: COMMERCIAL

## 2024-08-16 ENCOUNTER — TELEPHONE (OUTPATIENT)
Dept: OBSTETRICS AND GYNECOLOGY | Facility: CLINIC | Age: 39
End: 2024-08-16

## 2024-08-16 VITALS
HEIGHT: 69 IN | WEIGHT: 233 LBS | DIASTOLIC BLOOD PRESSURE: 78 MMHG | SYSTOLIC BLOOD PRESSURE: 132 MMHG | BODY MASS INDEX: 34.51 KG/M2

## 2024-08-16 DIAGNOSIS — O00.80 OTHER ECTOPIC PREGNANCY, UNSPECIFIED WHETHER INTRAUTERINE PREGNANCY PRESENT: ICD-10-CM

## 2024-08-16 DIAGNOSIS — O00.109 TUBAL PREGNANCY WITHOUT INTRAUTERINE PREGNANCY, UNSPECIFIED LATERALITY: Primary | ICD-10-CM

## 2024-08-16 DIAGNOSIS — R10.32 LLQ PAIN: ICD-10-CM

## 2024-08-16 DIAGNOSIS — O03.4 INCOMPLETE ABORTION: Primary | ICD-10-CM

## 2024-08-16 PROBLEM — O00.90 ECTOPIC PREGNANCY: Status: ACTIVE | Noted: 2024-08-16

## 2024-08-16 PROBLEM — N96 HABITUAL ABORTER NOT CURRENTLY PREGNANT: Status: RESOLVED | Noted: 2021-11-01 | Resolved: 2024-08-16

## 2024-08-16 LAB
ALBUMIN SERPL-MCNC: 4.2 G/DL (ref 3.5–5.2)
ALBUMIN/GLOB SERPL: 2 G/DL
ALP SERPL-CCNC: 95 U/L (ref 39–117)
ALT SERPL-CCNC: 39 U/L (ref 1–33)
AST SERPL-CCNC: 32 U/L (ref 1–32)
BASOPHILS # BLD AUTO: 0.04 10*3/MM3 (ref 0–0.2)
BASOPHILS NFR BLD AUTO: 0.5 % (ref 0–1.5)
BILIRUB SERPL-MCNC: 0.2 MG/DL (ref 0–1.2)
BUN SERPL-MCNC: 14 MG/DL (ref 6–20)
BUN/CREAT SERPL: 17.9 (ref 7–25)
CALCIUM SERPL-MCNC: 9.2 MG/DL (ref 8.6–10.5)
CHLORIDE SERPL-SCNC: 108 MMOL/L (ref 98–107)
CO2 SERPL-SCNC: 25.7 MMOL/L (ref 22–29)
CREAT SERPL-MCNC: 0.78 MG/DL (ref 0.57–1)
EGFRCR SERPLBLD CKD-EPI 2021: 100.5 ML/MIN/1.73
EOSINOPHIL # BLD AUTO: 0.07 10*3/MM3 (ref 0–0.4)
EOSINOPHIL NFR BLD AUTO: 0.9 % (ref 0.3–6.2)
ERYTHROCYTE [DISTWIDTH] IN BLOOD BY AUTOMATED COUNT: 12.5 % (ref 12.3–15.4)
GLOBULIN SER CALC-MCNC: 2.1 GM/DL
GLUCOSE SERPL-MCNC: 84 MG/DL (ref 65–99)
HCG INTACT+B SERPL-ACNC: 29 MIU/ML
HCT VFR BLD AUTO: 39.7 % (ref 34–46.6)
HGB BLD-MCNC: 13.1 G/DL (ref 12–15.9)
IMM GRANULOCYTES # BLD AUTO: 0.01 10*3/MM3 (ref 0–0.05)
IMM GRANULOCYTES NFR BLD AUTO: 0.1 % (ref 0–0.5)
LYMPHOCYTES # BLD AUTO: 2.31 10*3/MM3 (ref 0.7–3.1)
LYMPHOCYTES NFR BLD AUTO: 28.2 % (ref 19.6–45.3)
MCH RBC QN AUTO: 29.6 PG (ref 26.6–33)
MCHC RBC AUTO-ENTMCNC: 33 G/DL (ref 31.5–35.7)
MCV RBC AUTO: 89.8 FL (ref 79–97)
MONOCYTES # BLD AUTO: 0.4 10*3/MM3 (ref 0.1–0.9)
MONOCYTES NFR BLD AUTO: 4.9 % (ref 5–12)
NEUTROPHILS # BLD AUTO: 5.37 10*3/MM3 (ref 1.7–7)
NEUTROPHILS NFR BLD AUTO: 65.4 % (ref 42.7–76)
NRBC BLD AUTO-RTO: 0 /100 WBC (ref 0–0.2)
PLATELET # BLD AUTO: 317 10*3/MM3 (ref 140–450)
POTASSIUM SERPL-SCNC: 4.7 MMOL/L (ref 3.5–5.2)
PROT SERPL-MCNC: 6.3 G/DL (ref 6–8.5)
RBC # BLD AUTO: 4.42 10*6/MM3 (ref 3.77–5.28)
SODIUM SERPL-SCNC: 142 MMOL/L (ref 136–145)
WBC # BLD AUTO: 8.2 10*3/MM3 (ref 3.4–10.8)

## 2024-08-16 RX ORDER — METHOTREXATE 25 MG/ML
110 INJECTION INTRA-ARTERIAL; INTRAMUSCULAR; INTRATHECAL; INTRAVENOUS ONCE
Status: SHIPPED | OUTPATIENT
Start: 2024-08-16

## 2024-08-16 RX ORDER — METHOTREXATE 25 MG/ML
110 INJECTION INTRA-ARTERIAL; INTRAMUSCULAR; INTRATHECAL; INTRAVENOUS ONCE
Status: DISCONTINUED | OUTPATIENT
Start: 2024-08-16 | End: 2024-08-16

## 2024-08-16 RX ORDER — METHOTREXATE 25 MG/ML
50 INJECTION INTRA-ARTERIAL; INTRAMUSCULAR; INTRATHECAL; INTRAVENOUS ONCE
Qty: 4.4 ML | Refills: 0 | Status: SHIPPED | OUTPATIENT
Start: 2024-08-16 | End: 2024-08-17

## 2024-08-16 NOTE — PROGRESS NOTES
"      Chief Complaint   Patient presents with    Injections            HPI  Regla Holliday is a 39 y.o. female, , who presents for methotrexate due to presumed ectopic pregnancy.    Recent Tests:  She has had her hcg's followed for the last several days, and they have been plateaued around 28-32. She has not had methotrexate. She had a pelvic ultrasound  and the findings showed no evidence of IUP. There was a 2.9cm simple cyst on the left ovary.   The patient complains of cramping and sharp pain.  The pain is located in her lower abdomen.. Her past medical history is non-contributory.    Rh Status: Negative, received rhogam 8/10 in ER.  She reports no additional symptoms or complaints.    The additional following portions of the patient's history were reviewed and updated as appropriate: allergies and current medications.  Review of Systems  Review of Systems   Constitutional: Negative.    Respiratory: Negative.     Cardiovascular: Negative.    Gastrointestinal: Negative.    Genitourinary:  Positive for pelvic pain and vaginal bleeding.   Psychiatric/Behavioral:  Positive for stress. The patient is nervous/anxious.         I have reviewed and agree with the HPI, ROS, and historical information as entered above. JAILENE Perez      Objective   Physical Exam  /78 (BP Location: Right arm, Patient Position: Sitting, Cuff Size: Adult)   Ht 175.3 cm (69.02\")   Wt 106 kg (233 lb)   LMP 06/15/2024   BMI 34.39 kg/m²     Physical Exam  Vitals and nursing note reviewed.   Pulmonary:      Effort: Pulmonary effort is normal.   Musculoskeletal:         General: Normal range of motion.   Neurological:      Mental Status: She is alert and oriented to person, place, and time.          Methotrexate given by JAILENE Perez [facility].   Written consent obtained.   Lot [id number]  Exp 2025.   110mg dose given in two divided doses via IM injection.        Assessment and Plan    Problem List Items Addressed This " Visit       Ectopic pregnancy - Primary    Relevant Medications    methotrexate PF injection 110 mg    Methotrexate Sodium (methotrexate PF) 50 MG/2ML chemo syringe    Other Relevant Orders    HCG, B-subunit, Quantitative       Methotrexate risks/benefits/side effects/instructions discussed. Consent obtained. Methotrexate given in office today.   Hcg done today. Repeat Monday 8/19, and Thursday 8/22.   Pelvic Rest.  No douching, intercourse or use of tampons.  Call for an increase in bleeding, abdominal pain, or fever.  Serial HCG.  Report to ED if increase in pain or bleeding.       Mely Fernandez, JAILENE  08/16/2024

## 2024-08-16 NOTE — PROGRESS NOTES
Just documenting that we discussed likely left ectopic.  Methotrexate today.  HCG days 4 and 7 and f/u as scheduled.

## 2024-08-17 LAB — HCG INTACT+B SERPL-ACNC: 31 MIU/ML

## 2024-08-19 ENCOUNTER — LAB (OUTPATIENT)
Dept: OBSTETRICS AND GYNECOLOGY | Facility: CLINIC | Age: 39
End: 2024-08-19
Payer: COMMERCIAL

## 2024-08-19 DIAGNOSIS — O03.9 MISCARRIAGE: Primary | ICD-10-CM

## 2024-08-20 LAB — HCG INTACT+B SERPL-ACNC: 31 MIU/ML

## 2024-08-22 ENCOUNTER — OFFICE VISIT (OUTPATIENT)
Dept: OBSTETRICS AND GYNECOLOGY | Facility: CLINIC | Age: 39
End: 2024-08-22
Payer: COMMERCIAL

## 2024-08-22 VITALS
SYSTOLIC BLOOD PRESSURE: 110 MMHG | BODY MASS INDEX: 35.01 KG/M2 | WEIGHT: 236.4 LBS | HEIGHT: 69 IN | DIASTOLIC BLOOD PRESSURE: 78 MMHG

## 2024-08-22 DIAGNOSIS — Z30.018 ENCOUNTER FOR INITIAL PRESCRIPTION OF OTHER CONTRACEPTIVES: ICD-10-CM

## 2024-08-22 DIAGNOSIS — O00.80 OTHER ECTOPIC PREGNANCY, UNSPECIFIED WHETHER INTRAUTERINE PREGNANCY PRESENT: Primary | ICD-10-CM

## 2024-08-22 RX ORDER — CLONAZEPAM 0.5 MG
0.5 TABLET ORAL 2 TIMES DAILY PRN
COMMUNITY
Start: 2024-08-15

## 2024-08-22 NOTE — PROGRESS NOTES
"            Chief Complaint   Patient presents with    Follow-up       Subjective   HPI  Regla Holliday is a 39 y.o. female who presents for follow up on ectopic pregnancy.      At her last visit she was treated with  Methotrexate . Since then she reports her symptoms have improved. The patient reports additional symptoms as  bilateral pelvic pain that she describes as \"twinging\" pain .  She feels like her mood has significantly improved. Her side effects from the methotrexate were mild and have resolved.    Hasn't had any bleeding since the injection.    Pt wanted us to know about her poor experience with [facility], will detail in a separate note.    She did see her PCP and restarted the klonopin script, has only needed a few times. She did not start taking the lexapro.   She sees a therapist, has increased the frequency of her appts with him and feels she is doing much better.           Additional OB/GYN History     Last Pap : 1/3/23 Negative, HPV negative.  Last Completed Pap Smear            PAP SMEAR (Every 3 Years) Next due on 1/3/2026      2023  LIQUID-BASED PAP SMEAR, [facility] ([facility])    2021  SCANNED - PAP SMEAR    2021  SCANNED - PAP SMEAR                    Last mammogram: 2022  Last Completed Mammogram       This patient has no relevant Health Maintenance data.            Tobacco Usage?: No   OB History          4    Para        Term                AB   4    Living             SAB   4    IAB        Ectopic        Molar        Multiple        Live Births                      Current Outpatient Medications:     albuterol sulfate  (90 Base) MCG/ACT inhaler, INHALE TWO PUFFS BY MOUTH EVERY 4 HOURS AS NEEDED FOR WHEEZING OR SHORTNESS OF AIR, Disp: 8.5 g, Rfl: 2    budesonide-formoterol (Symbicort) 80-4.5 MCG/ACT inhaler, Inhale 2 puffs 2 (Two) Times a Day., Disp: 1 each, Rfl: 12    KlonoPIN 0.5 MG tablet, Take 1 tablet by mouth 2 (Two) Times a Day As Needed for " "Anxiety., Disp: , Rfl:     Lactic Ac-Citric Ac-Pot Bitart (PHEXXI) 1.8-1-0.4 % gel, Insert 1 applicator into the vagina 1 (One) Time As Needed (Administer vaginally immediately before or up to one hour before sex). Must use a new dose for each sex act, Disp: 12 applicator, Rfl: 1    prenatal vitamin (prenatal, CLASSIC, vitamin) tablet, Take  by mouth Daily. (Patient not taking: Reported on 8/22/2024), Disp: , Rfl:   No current facility-administered medications for this visit.     Past Medical History:   Diagnosis Date    ADHD     Breast cancer     Celiac disease     History of echocardiogram     History of Holter monitoring     Panic attacks         Past Surgical History:   Procedure Laterality Date    MASTECTOMY Bilateral 01/12/2022    MOUTH SURGERY      NASAL SEPTUM SURGERY      TONSILLECTOMY AND ADENOIDECTOMY         The additional following portions of the patient's history were reviewed and updated as appropriate: allergies, current medications, past family history, past medical history, past social history, past surgical history, and problem list.    Review of Systems   Constitutional: Negative.    HENT: Negative.     Eyes: Negative.    Respiratory: Negative.     Cardiovascular: Negative.    Gastrointestinal: Negative.    Endocrine: Negative.    Genitourinary:  Positive for pelvic pain.   Musculoskeletal: Negative.    Skin: Negative.    Allergic/Immunologic: Negative.    Neurological: Negative.    Hematological: Negative.    Psychiatric/Behavioral: Negative.         I have reviewed and agree with the HPI, ROS, and historical information as entered above. Mely Fernandez, JAILENE      Objective   /78   Ht 175.3 cm (69.02\")   Wt 107 kg (236 lb 6.4 oz)   LMP 06/15/2024   BMI 34.89 kg/m²     Physical Exam  Vitals and nursing note reviewed.   Constitutional:       Appearance: Normal appearance.   Pulmonary:      Effort: Pulmonary effort is normal.   Musculoskeletal:         General: Normal range of motion. "   Neurological:      Mental Status: She is alert and oriented to person, place, and time.   Psychiatric:         Attention and Perception: Attention normal.         Mood and Affect: Mood normal.         Speech: Speech normal.         Behavior: Behavior normal.         Thought Content: Thought content normal.         Judgment: Judgment normal.         Assessment & Plan     Assessment     Problem List Items Addressed This Visit       Contraception management    Relevant Medications    Lactic Ac-Citric Ac-Pot Bitart (PHEXXI) 1.8-1-0.4 % gel    Ectopic pregnancy - Primary    Relevant Orders    HCG, B-subunit, Quantitative       Plan     Repeat hcg today. Discussed should drop by >15%, if not, would need second dose of MTX.   Pt wondering about birth control after it is safe to have sex again, since they need to prevent pregnancy for the next few months. She states they will use condoms, but she has used Phexxi in the past as well and would like a refill of that sent.   Will f/u based on results of hcg.         Mely Fernandez, APRN  08/22/2024

## 2024-08-23 ENCOUNTER — OFFICE VISIT (OUTPATIENT)
Dept: OBSTETRICS AND GYNECOLOGY | Facility: CLINIC | Age: 39
End: 2024-08-23
Payer: COMMERCIAL

## 2024-08-23 ENCOUNTER — DOCUMENTATION (OUTPATIENT)
Dept: OBSTETRICS AND GYNECOLOGY | Facility: CLINIC | Age: 39
End: 2024-08-23

## 2024-08-23 VITALS
DIASTOLIC BLOOD PRESSURE: 62 MMHG | HEIGHT: 69 IN | WEIGHT: 238 LBS | SYSTOLIC BLOOD PRESSURE: 110 MMHG | BODY MASS INDEX: 35.25 KG/M2

## 2024-08-23 DIAGNOSIS — O00.90 ECTOPIC PREGNANCY WITHOUT INTRAUTERINE PREGNANCY, UNSPECIFIED LOCATION: Primary | ICD-10-CM

## 2024-08-23 LAB — HCG INTACT+B SERPL-ACNC: 40 MIU/ML

## 2024-08-23 RX ORDER — METHOTREXATE 25 MG/ML
50 INJECTION INTRA-ARTERIAL; INTRAMUSCULAR; INTRATHECAL; INTRAVENOUS ONCE
Qty: 6 ML | Refills: 0 | Status: SHIPPED | OUTPATIENT
Start: 2024-08-23 | End: 2024-08-24

## 2024-08-23 NOTE — PROGRESS NOTES
Pt states her experience with [facility] was really traumatic.    They immediately  her and her , and put her in an open area with homeless people, drug addicts, etc.     She wasn't suicidal, but was terrified they were going to keep her against her will.   They wanted to start her on lexapro and vistaril, didn't listen to her about her history of heart issues, she didn't want to take the vistaril due to the potential for prolonged QT interval. They made her take it before they would send her home. She was there for 6 hours.   She states all she wanted was some help dealing with another loss, was feeling very worthless and hopeless, since this is her fourth loss. She doesn't feel like that is an appropriate place to send our mothers who are struggling with loss, postpartum mood issues, etc.     Thanked patient for letting us know about her experience, as this is a new facility and marketed as a place to go when having an acute mental health crisis.

## 2024-08-23 NOTE — PROGRESS NOTES
"    Chief Complaint   Patient presents with    Injections    Follow-up    Ectopic Pregnancy            HPI  Regla Holliday is a 39 y.o. female, , who presents for follow up on a  ectopic pregnancy .  Patient received 1 dose of MTX on 2024.    HC24: 31  HC24: 31  HC: 40    Since then she has not passage of tissue. Her bleeding today is none. She reports a constant pressure with intermittent sharp twinges. The pain is located in her right-lower quadrant. Her past medical history is notable for spontaneous . She reports no additional symptoms or complaints.    Recent Tests:  US today: No  Rh Status: Negative.  Rhogam was given on 08/10/2024..      The additional following portions of the patient's history were reviewed and updated as appropriate: allergies and current medications.    Review of Systems   Constitutional: Negative.    HENT: Negative.     Eyes: Negative.    Respiratory: Negative.     Cardiovascular: Negative.    Gastrointestinal: Negative.    Endocrine: Negative.    Genitourinary: Negative.  Positive for pelvic pain.   Musculoskeletal: Negative.    Skin: Negative.    Allergic/Immunologic: Negative.    Neurological: Negative.    Hematological: Negative.    Psychiatric/Behavioral: Negative.           I have reviewed and agree with the HPI, ROS, and historical information as entered above. JAILENE Perez      Objective   /62   Ht 175.3 cm (69\")   Wt 108 kg (238 lb)   LMP 06/15/2024   BMI 35.15 kg/m²     Physical Exam  Vitals and nursing note reviewed.   Constitutional:       Appearance: Normal appearance.   Pulmonary:      Effort: Pulmonary effort is normal.   Musculoskeletal:         General: Normal range of motion.   Neurological:      Mental Status: She is alert and oriented to person, place, and time.       Methotrexate administered via IM injection by JAILENE Perez [facility]  Verbal consent obtained, written consent in the chart from " initial methotrexate given 8/16.   112mg methotrexate, 4.48ml in two equal doses.   Lot [id number].  Exp: 3/2026       Assessment and Plan    Problem List Items Addressed This Visit       Ectopic pregnancy - Primary    Relevant Medications    Methotrexate Sodium (methotrexate PF) 50 MG/2ML chemo syringe    Other Relevant Orders    US Non-ob Transvaginal       2nd dose methotrexate given.   Will repeat hcg in one week. Dr. Archibald would like to see her on Monday or Tuesday, Pt's partner can be here with her on Tuesday morning if she can be seen then. Will schedule, with ultrasound. May draw hcg that day as well based on Dr. Archibald's impression.   Return in about 4 days (around 8/27/2024) for ultrasound, Dragan, Recheck.          Mely Fernandez, APRN  08/23/2024

## 2024-08-27 ENCOUNTER — ANCILLARY ORDERS (OUTPATIENT)
Dept: OBSTETRICS AND GYNECOLOGY | Facility: CLINIC | Age: 39
End: 2024-08-27
Payer: COMMERCIAL

## 2024-08-27 ENCOUNTER — OFFICE VISIT (OUTPATIENT)
Dept: OBSTETRICS AND GYNECOLOGY | Facility: CLINIC | Age: 39
End: 2024-08-27
Payer: COMMERCIAL

## 2024-08-27 VITALS
BODY MASS INDEX: 35.16 KG/M2 | HEIGHT: 69 IN | WEIGHT: 237.4 LBS | SYSTOLIC BLOOD PRESSURE: 136 MMHG | DIASTOLIC BLOOD PRESSURE: 76 MMHG

## 2024-08-27 DIAGNOSIS — O00.90 ECTOPIC PREGNANCY WITHOUT INTRAUTERINE PREGNANCY, UNSPECIFIED LOCATION: Primary | ICD-10-CM

## 2024-08-27 NOTE — PROGRESS NOTES
"      Chief Complaint   Patient presents with    Ectopic Pregnancy    Follow-up            HPI  Regla Holliday is a 39 y.o. female, , who presents for follow up ectopic pregnancy. She has had 2 doses of Methotrexate: 24 and 24.    HC24: 31  HC24: 31  HC24: 40    Recent Tests:  She had a urine pregnancy test that was done 4 weeks ago that was positive.. She had a pelvic ultrasound today and the findings were suspicious for ectopic pregnancy on right adnexa vs. RT ovary.The patient complains of sharp and \"twinging\" pain. When she feels the \"twinge\" it's sharp then eases down. The pain is located in lower pelvis on both sides but worse on the RT side. Her past medical history is notable for spontaneous .    Rh Status: Negative- Rhogam given 8/10/24.  She also complains of back pain. She reports excessive night sweating since she first found out she was pregnant.  She reports she went to ER at  on 8/10/24 for excessive vaginal bleeding. After that she bled for about 5 days and it stopped. She hasn't bled anymore since.    The additional following portions of the patient's history were reviewed and updated as appropriate: allergies, current medications, past family history, past medical history, past social history, past surgical history, and problem list.  Review of Systems  Review of Systems   Constitutional: Negative.    HENT: Negative.     Eyes: Negative.    Respiratory: Negative.     Cardiovascular: Negative.    Gastrointestinal: Negative.    Endocrine: Negative.    Genitourinary:  Positive for pelvic pain.   Musculoskeletal: Negative.    Skin: Negative.    Allergic/Immunologic: Negative.    Neurological: Negative.    Hematological: Negative.    Psychiatric/Behavioral: Negative.          I have reviewed and agree with the HPI, ROS, and historical information as entered above. Blanca Archibald MD      Objective   Physical Exam  /76   Ht 175.3 cm (69\")   Wt " 108 kg (237 lb 6.4 oz)   LMP 06/15/2024   BMI 35.06 kg/m²     Physical Exam    Physical Exam:  General:  well developed; well nourished  no acute distress  mentation appropriate  obese - Body mass index is 35.06 kg/m².   Abdomen: soft, non-tender; no masses  no umbilical or inguinal hernias are present   Pelvis: Not performed.      Assessment and Plan    Problem List Items Addressed This Visit       Ectopic pregnancy - Primary    Relevant Orders    HCG, B-subunit, Quantitative (Completed)       ectopic pregnancy difficult to say what side.  Pain mostly on right.  Reviewed ultrasound findings with patient.  Pelvic Rest.  No douching, intercourse or use of tampons.  Call for an increase in bleeding, abdominal pain, or fever.  Serial HCG.  Reviewed possible need for laparoscopy if not decreasing, but I think it should.  6.   Return in about 1 week (around 9/3/2024) for Next scheduled follow up.      Blanca Archibald MD  08/27/2024

## 2024-08-29 ENCOUNTER — LAB (OUTPATIENT)
Dept: OBSTETRICS AND GYNECOLOGY | Facility: CLINIC | Age: 39
End: 2024-08-29
Payer: COMMERCIAL

## 2024-09-03 ENCOUNTER — OFFICE VISIT (OUTPATIENT)
Dept: OBSTETRICS AND GYNECOLOGY | Facility: CLINIC | Age: 39
End: 2024-09-03
Payer: COMMERCIAL

## 2024-09-03 VITALS — BODY MASS INDEX: 35.74 KG/M2 | SYSTOLIC BLOOD PRESSURE: 118 MMHG | WEIGHT: 242 LBS | DIASTOLIC BLOOD PRESSURE: 76 MMHG

## 2024-09-03 DIAGNOSIS — O00.90 ECTOPIC PREGNANCY WITHOUT INTRAUTERINE PREGNANCY, UNSPECIFIED LOCATION: Primary | ICD-10-CM

## 2024-09-03 LAB — HCG INTACT+B SERPL-ACNC: 5.18 MIU/ML

## 2024-09-03 NOTE — PROGRESS NOTES
Chief Complaint   Patient presents with    Ectopic Pregnancy    Follow-up       Subjective   HPI  Regla Holliday is a 39 y.o. female who presents for follow up on ectopic pregnancy.      She had 2 doses of Methotrexate: 24 and 24.     HC24: 31  HC24: 31  HC24: 40  HC24:11.20    Rh Status: Negative- Rhogam given 8/10/24.     She reports she is not currently having any bleeding. She is still having some twinges of pain more on the left but also still some on the right side.       Additional OB/GYN History     Last Pap :   Last Completed Pap Smear            PAP SMEAR (Every 3 Years) Next due on 1/3/2026      2023  LIQUID-BASED PAP SMEAR, [facility] ([facility])    2021  SCANNED - PAP SMEAR    2021  SCANNED - PAP SMEAR                    Last mammogram:   Last Completed Mammogram       This patient has no relevant Health Maintenance data.              OB History          4    Para        Term                AB   4    Living             SAB   4    IAB        Ectopic        Molar        Multiple        Live Births                      Current Outpatient Medications:     albuterol sulfate  (90 Base) MCG/ACT inhaler, INHALE TWO PUFFS BY MOUTH EVERY 4 HOURS AS NEEDED FOR WHEEZING OR SHORTNESS OF AIR, Disp: 8.5 g, Rfl: 2    budesonide-formoterol (Symbicort) 80-4.5 MCG/ACT inhaler, Inhale 2 puffs 2 (Two) Times a Day., Disp: 1 each, Rfl: 12    KlonoPIN 0.5 MG tablet, Take 1 tablet by mouth 2 (Two) Times a Day As Needed for Anxiety., Disp: , Rfl:      Past Medical History:   Diagnosis Date    ADHD     Breast cancer     Celiac disease     History of echocardiogram     History of Holter monitoring     Panic attacks         Past Surgical History:   Procedure Laterality Date    MASTECTOMY Bilateral 2022    MOUTH SURGERY      NASAL SEPTUM SURGERY      TONSILLECTOMY AND ADENOIDECTOMY         The additional following portions of the  patient's history were reviewed and updated as appropriate: allergies and current medications.    Review of Systems   Constitutional: Negative.    Respiratory: Negative.     Cardiovascular: Negative.    Gastrointestinal: Negative.    Genitourinary:  Positive for pelvic pain (right sided).   Psychiatric/Behavioral: Negative.         I have reviewed and agree with the HPI, ROS, and historical information as entered above. JAILENE Perez      Objective   /76   Wt 110 kg (242 lb)   LMP 06/15/2024   BMI 35.74 kg/m²     Physical Exam  Vitals and nursing note reviewed.   Constitutional:       Appearance: Normal appearance.   Pulmonary:      Effort: Pulmonary effort is normal.   Musculoskeletal:         General: Normal range of motion.   Neurological:      Mental Status: She is alert and oriented to person, place, and time.         Assessment & Plan     Assessment     Problem List Items Addressed This Visit       Ectopic pregnancy - Primary    Relevant Orders    HCG, B-subunit, Quantitative (Completed)           Plan     Repeat hcg today, will follow down to zero.   Discussed her period may return in the next few weeks.   We will follow up on the cysts seen in her last ultrasound in 6-8 wks. She is also due for her annual. Will schedule.   Return in about 6 weeks (around 10/15/2024) for Annual physical, ultrasound, Dragan Fernandez.        JAILENE Perez  09/03/2024

## 2024-09-18 ENCOUNTER — LAB (OUTPATIENT)
Dept: OBSTETRICS AND GYNECOLOGY | Facility: CLINIC | Age: 39
End: 2024-09-18
Payer: COMMERCIAL

## 2024-09-18 DIAGNOSIS — O00.91 ECTOPIC PREGNANCY WITH INTRAUTERINE PREGNANCY, UNSPECIFIED LOCATION: Primary | ICD-10-CM

## 2024-09-18 LAB — HCG INTACT+B SERPL-ACNC: 5.38 MIU/ML

## 2024-09-19 ENCOUNTER — LAB (OUTPATIENT)
Dept: OBSTETRICS AND GYNECOLOGY | Facility: CLINIC | Age: 39
End: 2024-09-19
Payer: COMMERCIAL

## 2024-09-19 DIAGNOSIS — O03.9 MISCARRIAGE: Primary | ICD-10-CM

## 2024-09-20 LAB — HCG INTACT+B SERPL-ACNC: 2 MIU/ML

## 2024-10-15 ENCOUNTER — OFFICE VISIT (OUTPATIENT)
Dept: OBSTETRICS AND GYNECOLOGY | Facility: CLINIC | Age: 39
End: 2024-10-15
Payer: COMMERCIAL

## 2024-10-15 VITALS
BODY MASS INDEX: 37.86 KG/M2 | DIASTOLIC BLOOD PRESSURE: 70 MMHG | WEIGHT: 255.6 LBS | SYSTOLIC BLOOD PRESSURE: 124 MMHG | HEIGHT: 69 IN

## 2024-10-15 DIAGNOSIS — L68.0 FEMALE HIRSUTISM: ICD-10-CM

## 2024-10-15 DIAGNOSIS — Z01.419 WOMEN'S ANNUAL ROUTINE GYNECOLOGICAL EXAMINATION: Primary | ICD-10-CM

## 2024-10-15 NOTE — PROGRESS NOTES
Gynecologic Annual Exam Note        Gynecologic Exam        Subjective     HPI  Regla Holliday is a 39 y.o.  female who presents for annual well woman exam as an established patient. Since her last annual the patient was diagnosed with ectopic pregnancy on 24, which was treated with Methotrexate. Patient's last menstrual period was 10/03/2024.  She has had 2 periods since ectopic. Saturating a pad every 2-3 hours.  The flow is heavy. She reports dysmenorrhea is severe occurring first 1-2 days of flow. She states she had a hormonal migraine, cramping, and lower back pain. Patient states the pain is debilitating. She has tried heating pad with some comfort, does not resolve.  Marital Status: .  She is sexually active. She has not had new partners.. STD testing recommendations have been explained to the patient and she does not desire STD testing.    The patient would like to discuss the following complaints today: possible PCOS, weight gain.   She started her period at 11 years old. They were heavy and painful, and she would frequently skip periods or have multiple in a month. She was put on OCP's for this when she was 14. Her periods were not heavy or painful while on OCP's.   She gained a large amount of weight after her miscarriages and breast cancer, subsequent mood changes and medications, etc. Several different factors contributed. More recently she lost weight going from 340lb to 214 at the lowest in May of this year. She has had an increase again over the last few months due to the ectopic and the stress of it. She just restarted her wegovy last week.     Periods have been regular for several months (excluding the ectopic), just very painful and heavy.She does have dark hair growth on her chin/neck and a few on her mastectomy scar lines. She also has occasional acne on neck/jawline.    Ultrasound was done today and shows a normal size uterus with homogenous myometrium, uniform  endometrium with ES 5.9mm. 2.2cm collapsing/resolving cyst on left ovary. No free fluid visualized. Pending physician review.       Additional OB/GYN History   contraceptive methods: Condoms  Desires to: do not start contraception  Thromboembolic Disease: none  History of migraines: yes without aura-hormonal       History of STD: no    Last Pap : 2023. Results: negative. HPV POOL: negative.   Last Completed Pap Smear            Ordered - PAP SMEAR (Every 3 Years) Ordered on 10/15/2024      2023  LIQUID-BASED PAP SMEAR, [facility] ([facility])    2021  SCANNED - PAP SMEAR    2021  SCANNED - PAP SMEAR                     History of abnormal Pap smear: yes - 10+ years ago- normal since per patient  Gardasil status:completed  Family history of uterine, colon, breast, or ovarian cancer: yes - herself and Paternal Aunt x2-Breast   Performs monthly Self-Breast Exam: had double Mastectomy 2022. Diagnosed 2021.   Exercises Regularly:yes  Feelings of Anxiety or Depression: yes - anxiety managed with medication  Tobacco Usage?: No       Current Outpatient Medications:     albuterol sulfate  (90 Base) MCG/ACT inhaler, INHALE TWO PUFFS BY MOUTH EVERY 4 HOURS AS NEEDED FOR WHEEZING OR SHORTNESS OF AIR, Disp: 8.5 g, Rfl: 2    budesonide-formoterol (Symbicort) 80-4.5 MCG/ACT inhaler, Inhale 2 puffs 2 (Two) Times a Day., Disp: 1 each, Rfl: 12    KlonoPIN 0.5 MG tablet, Take 1 tablet by mouth 2 (Two) Times a Day As Needed for Anxiety., Disp: , Rfl:      Patient denies the need for medication refills today.    OB History          4    Para        Term                AB   4    Living             SAB   4    IAB        Ectopic        Molar        Multiple        Live Births                    Health Maintenance   Topic Date Due    BMI FOLLOWUP  Never done    Pneumococcal Vaccine 0-64 (1 of 2 - PCV) Never done    Annual Gynecologic Pelvic and Breast Exam  2022    ANNUAL PHYSICAL   "01/03/2024    INFLUENZA VACCINE  08/01/2024    COVID-19 Vaccine (4 - 2023-24 season) 09/01/2024    MOST FORM  10/15/2025    PAP SMEAR  01/03/2026    TDAP/TD VACCINES (2 - Td or Tdap) 08/15/2034    HEPATITIS C SCREENING  Completed       Past Medical History:   Diagnosis Date    Abnormal Pap smear of cervix     ADHD     Asthma Since [place]    Albuterol    Breast cancer     Celiac disease     Depression     Ectopic pregnancy Aug 14 2024    History of echocardiogram     History of Holter monitoring     Panic attacks     Recurrent pregnancy loss, antepartum condition or complication     Rh incompatibility 2012    Trauma         Past Surgical History:   Procedure Laterality Date    BREAST BIOPSY  November 23 29021    + Breast Cancer Diagnosis    MASTECTOMY Bilateral 01/12/2022    MOUTH SURGERY      NASAL SEPTUM SURGERY      TONSILLECTOMY AND ADENOIDECTOMY      WISDOM TOOTH EXTRACTION  2005       The additional following portions of the patient's history were reviewed and updated as appropriate: allergies, current medications, past family history, past medical history, past social history, and past surgical history.    Review of Systems   Constitutional: Negative.    Respiratory: Negative.     Cardiovascular: Negative.    Gastrointestinal: Negative.    Genitourinary:  Positive for menstrual problem (heavy and painful).   Psychiatric/Behavioral:  Positive for depressed mood. The patient is nervous/anxious.          I have reviewed and agree with the HPI, ROS, and historical information as entered above. JAILENE Perez          Objective   /70 (BP Location: Right arm, Patient Position: Sitting, Cuff Size: Adult)   Ht 175.3 cm (69.02\")   Wt 116 kg (255 lb 9.6 oz)   LMP 10/03/2024   BMI 37.73 kg/m²     Physical Exam  Vitals and nursing note reviewed. Exam conducted with a chaperone present.   Constitutional:       General: She is not in acute distress.     Appearance: Normal appearance. She is well-developed. " She is not ill-appearing.   Neck:      Thyroid: No thyroid mass or thyromegaly.   Pulmonary:      Effort: Pulmonary effort is normal. No respiratory distress or retractions.   Chest:      Chest wall: No mass.   Breasts:     Right: Absent.      Left: Absent.          Comments: Bilateral mastectomy scars. Left breast with scars in axilla as well, this is where the cancer originated and she had lymph nodes removed.   Abdominal:      General: There is no distension.      Palpations: Abdomen is soft. Abdomen is not rigid. There is no mass.      Tenderness: There is no abdominal tenderness. There is no guarding or rebound.      Hernia: No hernia is present.   Genitourinary:     General: Normal vulva.      Exam position: Lithotomy position.      Labia:         Right: No rash, tenderness or lesion.         Left: No rash, tenderness or lesion.       Vagina: Normal. No vaginal discharge or lesions.      Cervix: Normal. No cervical motion tenderness, discharge, friability or cervical bleeding.      Uterus: Normal. Not enlarged, not fixed and not tender.       Adnexa: Right adnexa normal and left adnexa normal.        Right: No mass or tenderness.          Left: No mass or tenderness.        Rectum: Normal. No external hemorrhoid.   Musculoskeletal:      Cervical back: No muscular tenderness.   Skin:     General: Skin is warm and dry.   Neurological:      Mental Status: She is alert and oriented to person, place, and time.   Psychiatric:         Mood and Affect: Mood normal.         Behavior: Behavior normal.            Assessment and Plan    Problem List Items Addressed This Visit    None  Visit Diagnoses       Female hirsutism    -  Primary    Relevant Orders    CBC & Differential    Comprehensive Metabolic Panel    DHEA-Sulfate    Estradiol    Follicle Stimulating Hormone    Hemoglobin A1c    Luteinizing Hormone    Testosterone    TSH    Women's annual routine gynecological examination        Relevant Orders    LIQUID-BASED  PAP SMEAR WITH HPV GENOTYPING IF ASCUS ([facility])            GYN annual well woman exam.   Reviewed pap guidelines. Pt likes to do her pap every year since she has a personal history of cancer. She knows they are not correlated, but she is more comfortable doing the preventative screening exams yearly when possible.   Labs obtained to check for possibility of PCOS or other hormonal imbalances. Recommend Balance vitamin. She is currently on semaglutide as well.   Recommend seeing YAMILETH since they have had 3 miscarriages and one ectopic. She has had recurrent miscarriage labs.    RTC in 1 year or PRN with problems  Return in about 1 year (around 10/15/2025) for Annual physical.    Mely Fernandez, APRN  10/15/2024

## 2024-10-16 LAB
ALBUMIN SERPL-MCNC: 4.3 G/DL (ref 3.5–5.2)
ALBUMIN/GLOB SERPL: 2 G/DL
ALP SERPL-CCNC: 98 U/L (ref 39–117)
ALT SERPL-CCNC: 24 U/L (ref 1–33)
AST SERPL-CCNC: 23 U/L (ref 1–32)
BASOPHILS # BLD AUTO: 0.05 10*3/MM3 (ref 0–0.2)
BASOPHILS NFR BLD AUTO: 0.7 % (ref 0–1.5)
BILIRUB SERPL-MCNC: 0.4 MG/DL (ref 0–1.2)
BUN SERPL-MCNC: 13 MG/DL (ref 6–20)
BUN/CREAT SERPL: 16.5 (ref 7–25)
CALCIUM SERPL-MCNC: 9 MG/DL (ref 8.6–10.5)
CHLORIDE SERPL-SCNC: 101 MMOL/L (ref 98–107)
CO2 SERPL-SCNC: 25.1 MMOL/L (ref 22–29)
CREAT SERPL-MCNC: 0.79 MG/DL (ref 0.57–1)
DHEA-S SERPL-MCNC: 141 UG/DL (ref 57.3–279.2)
EGFRCR SERPLBLD CKD-EPI 2021: 98.9 ML/MIN/1.73
EOSINOPHIL # BLD AUTO: 0.07 10*3/MM3 (ref 0–0.4)
EOSINOPHIL NFR BLD AUTO: 1 % (ref 0.3–6.2)
ERYTHROCYTE [DISTWIDTH] IN BLOOD BY AUTOMATED COUNT: 12.2 % (ref 12.3–15.4)
ESTRADIOL SERPL-MCNC: 77.4 PG/ML
FSH SERPL-ACNC: 10.3 MIU/ML
GLOBULIN SER CALC-MCNC: 2.1 GM/DL
GLUCOSE SERPL-MCNC: 78 MG/DL (ref 65–99)
HBA1C MFR BLD: 4.9 % (ref 4.8–5.6)
HCT VFR BLD AUTO: 43.6 % (ref 34–46.6)
HGB BLD-MCNC: 13.9 G/DL (ref 12–15.9)
IMM GRANULOCYTES # BLD AUTO: 0.02 10*3/MM3 (ref 0–0.05)
IMM GRANULOCYTES NFR BLD AUTO: 0.3 % (ref 0–0.5)
LH SERPL-ACNC: 15.9 MIU/ML
LYMPHOCYTES # BLD AUTO: 1.99 10*3/MM3 (ref 0.7–3.1)
LYMPHOCYTES NFR BLD AUTO: 28.3 % (ref 19.6–45.3)
MCH RBC QN AUTO: 29.4 PG (ref 26.6–33)
MCHC RBC AUTO-ENTMCNC: 31.9 G/DL (ref 31.5–35.7)
MCV RBC AUTO: 92.2 FL (ref 79–97)
MONOCYTES # BLD AUTO: 0.35 10*3/MM3 (ref 0.1–0.9)
MONOCYTES NFR BLD AUTO: 5 % (ref 5–12)
NEUTROPHILS # BLD AUTO: 4.55 10*3/MM3 (ref 1.7–7)
NEUTROPHILS NFR BLD AUTO: 64.7 % (ref 42.7–76)
NRBC BLD AUTO-RTO: 0 /100 WBC (ref 0–0.2)
PLATELET # BLD AUTO: 244 10*3/MM3 (ref 140–450)
POTASSIUM SERPL-SCNC: 5 MMOL/L (ref 3.5–5.2)
PROT SERPL-MCNC: 6.4 G/DL (ref 6–8.5)
RBC # BLD AUTO: 4.73 10*6/MM3 (ref 3.77–5.28)
REF LAB TEST METHOD: NORMAL
SODIUM SERPL-SCNC: 137 MMOL/L (ref 136–145)
TESTOST SERPL-MCNC: 16 NG/DL (ref 8–60)
TSH SERPL DL<=0.005 MIU/L-ACNC: 2.78 UIU/ML (ref 0.27–4.2)
WBC # BLD AUTO: 7.03 10*3/MM3 (ref 3.4–10.8)

## 2024-10-19 PROBLEM — Z67.91 RH NEGATIVE STATUS DURING PREGNANCY IN FIRST TRIMESTER: Status: RESOLVED | Noted: 2024-08-14 | Resolved: 2024-10-19

## 2024-10-19 PROBLEM — O26.891 RH NEGATIVE STATUS DURING PREGNANCY IN FIRST TRIMESTER: Status: RESOLVED | Noted: 2024-08-14 | Resolved: 2024-10-19

## 2024-10-19 PROBLEM — O20.9 FIRST TRIMESTER BLEEDING: Status: RESOLVED | Noted: 2024-08-15 | Resolved: 2024-10-19

## 2024-10-19 PROBLEM — O00.90 ECTOPIC PREGNANCY: Status: RESOLVED | Noted: 2024-08-16 | Resolved: 2024-10-19
